# Patient Record
Sex: FEMALE | Race: WHITE | NOT HISPANIC OR LATINO | Employment: OTHER | URBAN - METROPOLITAN AREA
[De-identification: names, ages, dates, MRNs, and addresses within clinical notes are randomized per-mention and may not be internally consistent; named-entity substitution may affect disease eponyms.]

---

## 2018-07-24 ENCOUNTER — HOSPITAL ENCOUNTER (OUTPATIENT)
Dept: RADIOLOGY | Facility: MEDICAL CENTER | Age: 73
End: 2018-07-24
Attending: EMERGENCY MEDICINE
Payer: MEDICARE

## 2018-07-24 DIAGNOSIS — S93.402D SPRAIN OF LEFT ANKLE, UNSPECIFIED LIGAMENT, SUBSEQUENT ENCOUNTER: ICD-10-CM

## 2018-07-24 PROCEDURE — 73610 X-RAY EXAM OF ANKLE: CPT | Mod: LT

## 2018-10-02 ENCOUNTER — HOSPITAL ENCOUNTER (OUTPATIENT)
Dept: LAB | Facility: MEDICAL CENTER | Age: 73
End: 2018-10-02
Attending: EMERGENCY MEDICINE
Payer: MEDICARE

## 2018-10-02 LAB
BASOPHILS # BLD AUTO: 0.7 % (ref 0–1.8)
BASOPHILS # BLD: 0.04 K/UL (ref 0–0.12)
EOSINOPHIL # BLD AUTO: 0.08 K/UL (ref 0–0.51)
EOSINOPHIL NFR BLD: 1.3 % (ref 0–6.9)
ERYTHROCYTE [DISTWIDTH] IN BLOOD BY AUTOMATED COUNT: 45.1 FL (ref 35.9–50)
ESTRADIOL SERPL-MCNC: <20 PG/ML
HCT VFR BLD AUTO: 39.2 % (ref 37–47)
HGB BLD-MCNC: 12.5 G/DL (ref 12–16)
IMM GRANULOCYTES # BLD AUTO: 0.01 K/UL (ref 0–0.11)
IMM GRANULOCYTES NFR BLD AUTO: 0.2 % (ref 0–0.9)
LYMPHOCYTES # BLD AUTO: 1.92 K/UL (ref 1–4.8)
LYMPHOCYTES NFR BLD: 32.2 % (ref 22–41)
MCH RBC QN AUTO: 29.7 PG (ref 27–33)
MCHC RBC AUTO-ENTMCNC: 31.9 G/DL (ref 33.6–35)
MCV RBC AUTO: 93.1 FL (ref 81.4–97.8)
MONOCYTES # BLD AUTO: 0.44 K/UL (ref 0–0.85)
MONOCYTES NFR BLD AUTO: 7.4 % (ref 0–13.4)
NEUTROPHILS # BLD AUTO: 3.48 K/UL (ref 2–7.15)
NEUTROPHILS NFR BLD: 58.2 % (ref 44–72)
NRBC # BLD AUTO: 0 K/UL
NRBC BLD-RTO: 0 /100 WBC
PLATELET # BLD AUTO: 257 K/UL (ref 164–446)
PMV BLD AUTO: 10 FL (ref 9–12.9)
PROGEST SERPL-MCNC: 0.36 NG/ML
RBC # BLD AUTO: 4.21 M/UL (ref 4.2–5.4)
VIT B12 SERPL-MCNC: 1457 PG/ML (ref 211–911)
WBC # BLD AUTO: 6 K/UL (ref 4.8–10.8)

## 2018-10-02 PROCEDURE — 84144 ASSAY OF PROGESTERONE: CPT

## 2018-10-02 PROCEDURE — 84403 ASSAY OF TOTAL TESTOSTERONE: CPT

## 2018-10-02 PROCEDURE — 36415 COLL VENOUS BLD VENIPUNCTURE: CPT

## 2018-10-02 PROCEDURE — 82670 ASSAY OF TOTAL ESTRADIOL: CPT

## 2018-10-02 PROCEDURE — 84270 ASSAY OF SEX HORMONE GLOBUL: CPT

## 2018-10-02 PROCEDURE — 85025 COMPLETE CBC W/AUTO DIFF WBC: CPT

## 2018-10-02 PROCEDURE — 82607 VITAMIN B-12: CPT

## 2018-10-06 LAB
SHBG SERPL-SCNC: 169 NMOL/L (ref 30–135)
TESTOST FREE SERPL-MCNC: 1.5 PG/ML (ref 0.6–3.8)
TESTOST SERPL-MCNC: 29 NG/DL (ref 5–32)

## 2020-09-20 ENCOUNTER — APPOINTMENT (OUTPATIENT)
Dept: RADIOLOGY | Facility: MEDICAL CENTER | Age: 75
DRG: 062 | End: 2020-09-20
Attending: EMERGENCY MEDICINE
Payer: MEDICARE

## 2020-09-20 ENCOUNTER — HOSPITAL ENCOUNTER (INPATIENT)
Facility: MEDICAL CENTER | Age: 75
LOS: 2 days | DRG: 062 | End: 2020-09-22
Attending: EMERGENCY MEDICINE | Admitting: HOSPITALIST
Payer: MEDICARE

## 2020-09-20 DIAGNOSIS — I63.9 ACUTE CVA (CEREBROVASCULAR ACCIDENT) (HCC): ICD-10-CM

## 2020-09-20 DIAGNOSIS — I63.9 ACUTE THROMBOTIC STROKE (HCC): ICD-10-CM

## 2020-09-20 PROBLEM — R53.1 RIGHT SIDED WEAKNESS: Status: ACTIVE | Noted: 2020-09-20

## 2020-09-20 LAB
ABO + RH BLD: NORMAL
ABO GROUP BLD: NORMAL
ALBUMIN SERPL BCP-MCNC: 4.6 G/DL (ref 3.2–4.9)
ALBUMIN/GLOB SERPL: 2 G/DL
ALP SERPL-CCNC: 95 U/L (ref 30–99)
ALT SERPL-CCNC: 20 U/L (ref 2–50)
ANION GAP SERPL CALC-SCNC: 16 MMOL/L (ref 7–16)
APTT PPP: 27.5 SEC (ref 24.7–36)
AST SERPL-CCNC: 25 U/L (ref 12–45)
BASOPHILS # BLD AUTO: 0.2 % (ref 0–1.8)
BASOPHILS # BLD AUTO: 0.3 % (ref 0–1.8)
BASOPHILS # BLD: 0.01 K/UL (ref 0–0.12)
BASOPHILS # BLD: 0.02 K/UL (ref 0–0.12)
BILIRUB SERPL-MCNC: 0.5 MG/DL (ref 0.1–1.5)
BLD GP AB SCN SERPL QL: NORMAL
BUN SERPL-MCNC: 14 MG/DL (ref 8–22)
CALCIUM SERPL-MCNC: 9.4 MG/DL (ref 8.5–10.5)
CHLORIDE SERPL-SCNC: 95 MMOL/L (ref 96–112)
CHOLEST SERPL-MCNC: 223 MG/DL (ref 100–199)
CO2 SERPL-SCNC: 23 MMOL/L (ref 20–33)
COVID ORDER STATUS COVID19: NORMAL
CREAT SERPL-MCNC: 0.59 MG/DL (ref 0.5–1.4)
EKG IMPRESSION: NORMAL
EOSINOPHIL # BLD AUTO: 0.07 K/UL (ref 0–0.51)
EOSINOPHIL # BLD AUTO: 0.1 K/UL (ref 0–0.51)
EOSINOPHIL NFR BLD: 1.3 % (ref 0–6.9)
EOSINOPHIL NFR BLD: 1.7 % (ref 0–6.9)
ERYTHROCYTE [DISTWIDTH] IN BLOOD BY AUTOMATED COUNT: 46.5 FL (ref 35.9–50)
ERYTHROCYTE [DISTWIDTH] IN BLOOD BY AUTOMATED COUNT: 46.8 FL (ref 35.9–50)
EST. AVERAGE GLUCOSE BLD GHB EST-MCNC: 123 MG/DL
GLOBULIN SER CALC-MCNC: 2.3 G/DL (ref 1.9–3.5)
GLUCOSE BLD-MCNC: 90 MG/DL (ref 65–99)
GLUCOSE SERPL-MCNC: 100 MG/DL (ref 65–99)
HBA1C MFR BLD: 5.9 % (ref 0–5.6)
HCT VFR BLD AUTO: 37.1 % (ref 37–47)
HCT VFR BLD AUTO: 38.2 % (ref 37–47)
HDLC SERPL-MCNC: 107 MG/DL
HGB BLD-MCNC: 12.3 G/DL (ref 12–16)
HGB BLD-MCNC: 12.5 G/DL (ref 12–16)
IMM GRANULOCYTES # BLD AUTO: 0.01 K/UL (ref 0–0.11)
IMM GRANULOCYTES # BLD AUTO: 0.01 K/UL (ref 0–0.11)
IMM GRANULOCYTES NFR BLD AUTO: 0.2 % (ref 0–0.9)
IMM GRANULOCYTES NFR BLD AUTO: 0.2 % (ref 0–0.9)
INR PPP: 0.87 (ref 0.87–1.13)
LDLC SERPL CALC-MCNC: 104 MG/DL
LYMPHOCYTES # BLD AUTO: 1.6 K/UL (ref 1–4.8)
LYMPHOCYTES # BLD AUTO: 2.01 K/UL (ref 1–4.8)
LYMPHOCYTES NFR BLD: 27.9 % (ref 22–41)
LYMPHOCYTES NFR BLD: 38.4 % (ref 22–41)
MCH RBC QN AUTO: 30.4 PG (ref 27–33)
MCH RBC QN AUTO: 30.7 PG (ref 27–33)
MCHC RBC AUTO-ENTMCNC: 32.7 G/DL (ref 33.6–35)
MCHC RBC AUTO-ENTMCNC: 33.2 G/DL (ref 33.6–35)
MCV RBC AUTO: 92.5 FL (ref 81.4–97.8)
MCV RBC AUTO: 92.9 FL (ref 81.4–97.8)
MONOCYTES # BLD AUTO: 0.48 K/UL (ref 0–0.85)
MONOCYTES # BLD AUTO: 0.53 K/UL (ref 0–0.85)
MONOCYTES NFR BLD AUTO: 10.1 % (ref 0–13.4)
MONOCYTES NFR BLD AUTO: 8.4 % (ref 0–13.4)
NEUTROPHILS # BLD AUTO: 2.6 K/UL (ref 2–7.15)
NEUTROPHILS # BLD AUTO: 3.53 K/UL (ref 2–7.15)
NEUTROPHILS NFR BLD: 49.8 % (ref 44–72)
NEUTROPHILS NFR BLD: 61.5 % (ref 44–72)
NRBC # BLD AUTO: 0 K/UL
NRBC # BLD AUTO: 0 K/UL
NRBC BLD-RTO: 0 /100 WBC
NRBC BLD-RTO: 0 /100 WBC
PLATELET # BLD AUTO: 240 K/UL (ref 164–446)
PLATELET # BLD AUTO: 251 K/UL (ref 164–446)
PMV BLD AUTO: 9.5 FL (ref 9–12.9)
PMV BLD AUTO: 9.8 FL (ref 9–12.9)
POTASSIUM SERPL-SCNC: 4 MMOL/L (ref 3.6–5.5)
PROT SERPL-MCNC: 6.9 G/DL (ref 6–8.2)
PROTHROMBIN TIME: 12 SEC (ref 12–14.6)
RBC # BLD AUTO: 4.01 M/UL (ref 4.2–5.4)
RBC # BLD AUTO: 4.11 M/UL (ref 4.2–5.4)
RH BLD: NORMAL
SARS-COV-2 RDRP RESP QL NAA+PROBE: NOTDETECTED
SODIUM SERPL-SCNC: 134 MMOL/L (ref 135–145)
SPECIMEN SOURCE: NORMAL
TRIGL SERPL-MCNC: 60 MG/DL (ref 0–149)
TROPONIN T SERPL-MCNC: 12 NG/L (ref 6–19)
WBC # BLD AUTO: 5.2 K/UL (ref 4.8–10.8)
WBC # BLD AUTO: 5.7 K/UL (ref 4.8–10.8)

## 2020-09-20 PROCEDURE — 80061 LIPID PANEL: CPT

## 2020-09-20 PROCEDURE — 3E03317 INTRODUCTION OF OTHER THROMBOLYTIC INTO PERIPHERAL VEIN, PERCUTANEOUS APPROACH: ICD-10-PCS | Performed by: EMERGENCY MEDICINE

## 2020-09-20 PROCEDURE — C9803 HOPD COVID-19 SPEC COLLECT: HCPCS | Performed by: EMERGENCY MEDICINE

## 2020-09-20 PROCEDURE — U0004 COV-19 TEST NON-CDC HGH THRU: HCPCS

## 2020-09-20 PROCEDURE — 99291 CRITICAL CARE FIRST HOUR: CPT | Performed by: INTERNAL MEDICINE

## 2020-09-20 PROCEDURE — 0042T CT-CEREBRAL PERFUSION ANALYSIS: CPT

## 2020-09-20 PROCEDURE — 71045 X-RAY EXAM CHEST 1 VIEW: CPT

## 2020-09-20 PROCEDURE — 700111 HCHG RX REV CODE 636 W/ 250 OVERRIDE (IP): Mod: JG | Performed by: PSYCHIATRY & NEUROLOGY

## 2020-09-20 PROCEDURE — 85610 PROTHROMBIN TIME: CPT

## 2020-09-20 PROCEDURE — 99291 CRITICAL CARE FIRST HOUR: CPT | Performed by: PSYCHIATRY & NEUROLOGY

## 2020-09-20 PROCEDURE — 70496 CT ANGIOGRAPHY HEAD: CPT

## 2020-09-20 PROCEDURE — 82962 GLUCOSE BLOOD TEST: CPT

## 2020-09-20 PROCEDURE — 99291 CRITICAL CARE FIRST HOUR: CPT

## 2020-09-20 PROCEDURE — 86901 BLOOD TYPING SEROLOGIC RH(D): CPT

## 2020-09-20 PROCEDURE — 86900 BLOOD TYPING SEROLOGIC ABO: CPT

## 2020-09-20 PROCEDURE — 70498 CT ANGIOGRAPHY NECK: CPT

## 2020-09-20 PROCEDURE — 700117 HCHG RX CONTRAST REV CODE 255: Performed by: EMERGENCY MEDICINE

## 2020-09-20 PROCEDURE — 37195 THROMBOLYTIC THERAPY STROKE: CPT

## 2020-09-20 PROCEDURE — 94760 N-INVAS EAR/PLS OXIMETRY 1: CPT

## 2020-09-20 PROCEDURE — 83036 HEMOGLOBIN GLYCOSYLATED A1C: CPT

## 2020-09-20 PROCEDURE — 770022 HCHG ROOM/CARE - ICU (200)

## 2020-09-20 PROCEDURE — 85025 COMPLETE CBC W/AUTO DIFF WBC: CPT | Mod: 91

## 2020-09-20 PROCEDURE — 84484 ASSAY OF TROPONIN QUANT: CPT

## 2020-09-20 PROCEDURE — 85730 THROMBOPLASTIN TIME PARTIAL: CPT

## 2020-09-20 PROCEDURE — 93005 ELECTROCARDIOGRAM TRACING: CPT | Performed by: EMERGENCY MEDICINE

## 2020-09-20 PROCEDURE — 86850 RBC ANTIBODY SCREEN: CPT

## 2020-09-20 PROCEDURE — 700105 HCHG RX REV CODE 258: Performed by: PSYCHIATRY & NEUROLOGY

## 2020-09-20 PROCEDURE — 70450 CT HEAD/BRAIN W/O DYE: CPT

## 2020-09-20 PROCEDURE — 80053 COMPREHEN METABOLIC PANEL: CPT

## 2020-09-20 PROCEDURE — 700105 HCHG RX REV CODE 258: Performed by: INTERNAL MEDICINE

## 2020-09-20 RX ORDER — ATORVASTATIN CALCIUM 80 MG/1
80 TABLET, FILM COATED ORAL EVERY EVENING
Status: DISCONTINUED | OUTPATIENT
Start: 2020-09-20 | End: 2020-09-22 | Stop reason: HOSPADM

## 2020-09-20 RX ORDER — LABETALOL HYDROCHLORIDE 5 MG/ML
10 INJECTION, SOLUTION INTRAVENOUS EVERY 4 HOURS PRN
Status: DISCONTINUED | OUTPATIENT
Start: 2020-09-20 | End: 2020-09-22 | Stop reason: HOSPADM

## 2020-09-20 RX ORDER — SODIUM CHLORIDE 9 MG/ML
50 INJECTION, SOLUTION INTRAVENOUS ONCE
Status: COMPLETED | OUTPATIENT
Start: 2020-09-20 | End: 2020-09-20

## 2020-09-20 RX ORDER — ENALAPRILAT 1.25 MG/ML
1.25 INJECTION INTRAVENOUS EVERY 6 HOURS PRN
Status: DISCONTINUED | OUTPATIENT
Start: 2020-09-20 | End: 2020-09-22 | Stop reason: HOSPADM

## 2020-09-20 RX ORDER — ASPIRIN 81 MG/1
324 TABLET, CHEWABLE ORAL DAILY
Status: DISCONTINUED | OUTPATIENT
Start: 2020-09-21 | End: 2020-09-21

## 2020-09-20 RX ORDER — TIMOLOL MALEATE 5 MG/ML
1 SOLUTION/ DROPS OPHTHALMIC EVERY MORNING
Status: DISCONTINUED | OUTPATIENT
Start: 2020-09-21 | End: 2020-09-22 | Stop reason: HOSPADM

## 2020-09-20 RX ORDER — SODIUM CHLORIDE 9 MG/ML
INJECTION, SOLUTION INTRAVENOUS CONTINUOUS
Status: DISCONTINUED | OUTPATIENT
Start: 2020-09-20 | End: 2020-09-21

## 2020-09-20 RX ORDER — ONDANSETRON 2 MG/ML
4 INJECTION INTRAMUSCULAR; INTRAVENOUS EVERY 4 HOURS PRN
Status: DISCONTINUED | OUTPATIENT
Start: 2020-09-20 | End: 2020-09-22 | Stop reason: HOSPADM

## 2020-09-20 RX ORDER — AMLODIPINE BESYLATE 5 MG/1
5 TABLET ORAL EVERY MORNING
COMMUNITY

## 2020-09-20 RX ORDER — BRIMONIDINE TARTRATE 2 MG/ML
1 SOLUTION/ DROPS OPHTHALMIC 2 TIMES DAILY
COMMUNITY

## 2020-09-20 RX ORDER — BISACODYL 10 MG
10 SUPPOSITORY, RECTAL RECTAL
Status: DISCONTINUED | OUTPATIENT
Start: 2020-09-20 | End: 2020-09-22 | Stop reason: HOSPADM

## 2020-09-20 RX ORDER — ASPIRIN 300 MG/1
300 SUPPOSITORY RECTAL DAILY
Status: DISCONTINUED | OUTPATIENT
Start: 2020-09-21 | End: 2020-09-21

## 2020-09-20 RX ORDER — TIMOLOL MALEATE 5 MG/ML
1 SOLUTION/ DROPS OPHTHALMIC EVERY MORNING
COMMUNITY

## 2020-09-20 RX ORDER — BRIMONIDINE TARTRATE 2 MG/ML
1 SOLUTION/ DROPS OPHTHALMIC 2 TIMES DAILY
Status: DISCONTINUED | OUTPATIENT
Start: 2020-09-20 | End: 2020-09-22 | Stop reason: HOSPADM

## 2020-09-20 RX ORDER — ASPIRIN 325 MG
325 TABLET ORAL DAILY
Status: DISCONTINUED | OUTPATIENT
Start: 2020-09-21 | End: 2020-09-21

## 2020-09-20 RX ORDER — AMOXICILLIN 250 MG
2 CAPSULE ORAL 2 TIMES DAILY
Status: DISCONTINUED | OUTPATIENT
Start: 2020-09-20 | End: 2020-09-22 | Stop reason: HOSPADM

## 2020-09-20 RX ORDER — ONDANSETRON 4 MG/1
4 TABLET, ORALLY DISINTEGRATING ORAL EVERY 4 HOURS PRN
Status: DISCONTINUED | OUTPATIENT
Start: 2020-09-20 | End: 2020-09-22 | Stop reason: HOSPADM

## 2020-09-20 RX ORDER — POLYETHYLENE GLYCOL 3350 17 G/17G
1 POWDER, FOR SOLUTION ORAL
Status: DISCONTINUED | OUTPATIENT
Start: 2020-09-20 | End: 2020-09-22 | Stop reason: HOSPADM

## 2020-09-20 RX ADMIN — IOHEXOL 40 ML: 350 INJECTION, SOLUTION INTRAVENOUS at 19:49

## 2020-09-20 RX ADMIN — ALTEPLASE 42 MG: KIT at 19:52

## 2020-09-20 RX ADMIN — SODIUM CHLORIDE: 9 INJECTION, SOLUTION INTRAVENOUS at 21:59

## 2020-09-20 RX ADMIN — IOHEXOL 80 ML: 350 INJECTION, SOLUTION INTRAVENOUS at 19:51

## 2020-09-20 RX ADMIN — SODIUM CHLORIDE 50 ML: 9 INJECTION, SOLUTION INTRAVENOUS at 20:52

## 2020-09-20 SDOH — HEALTH STABILITY: MENTAL HEALTH: HOW OFTEN DO YOU HAVE A DRINK CONTAINING ALCOHOL?: NEVER

## 2020-09-20 ASSESSMENT — ENCOUNTER SYMPTOMS
SINUS PAIN: 0
HEARTBURN: 0
BLURRED VISION: 0
PALPITATIONS: 0
DEPRESSION: 0
WHEEZING: 0
FOCAL WEAKNESS: 1
WEIGHT LOSS: 0
CHILLS: 0
SENSORY CHANGE: 1
DIZZINESS: 0
SPUTUM PRODUCTION: 0
BRUISES/BLEEDS EASILY: 0
FEVER: 0
POLYDIPSIA: 0
PHOTOPHOBIA: 0
VOMITING: 0
BACK PAIN: 0
NERVOUS/ANXIOUS: 0
TINGLING: 0
HEADACHES: 0
BLOOD IN STOOL: 0
STRIDOR: 0
DIAPHORESIS: 0
SHORTNESS OF BREATH: 0
SPEECH CHANGE: 1
MYALGIAS: 0
COUGH: 0
DOUBLE VISION: 0
NECK PAIN: 0
HEMOPTYSIS: 0

## 2020-09-20 ASSESSMENT — PAIN DESCRIPTION - PAIN TYPE: TYPE: ACUTE PAIN

## 2020-09-20 ASSESSMENT — FIBROSIS 4 INDEX: FIB4 SCORE: 1.77

## 2020-09-21 ENCOUNTER — APPOINTMENT (OUTPATIENT)
Dept: RADIOLOGY | Facility: MEDICAL CENTER | Age: 75
DRG: 062 | End: 2020-09-21
Attending: PSYCHIATRY & NEUROLOGY
Payer: MEDICARE

## 2020-09-21 ENCOUNTER — APPOINTMENT (OUTPATIENT)
Dept: CARDIOLOGY | Facility: MEDICAL CENTER | Age: 75
DRG: 062 | End: 2020-09-21
Attending: INTERNAL MEDICINE
Payer: MEDICARE

## 2020-09-21 DIAGNOSIS — I63.9 ACUTE CVA (CEREBROVASCULAR ACCIDENT) (HCC): ICD-10-CM

## 2020-09-21 DIAGNOSIS — R53.1 RIGHT SIDED WEAKNESS: ICD-10-CM

## 2020-09-21 LAB
ANION GAP SERPL CALC-SCNC: 12 MMOL/L (ref 7–16)
BUN SERPL-MCNC: 11 MG/DL (ref 8–22)
CALCIUM SERPL-MCNC: 8.4 MG/DL (ref 8.5–10.5)
CHLORIDE SERPL-SCNC: 101 MMOL/L (ref 96–112)
CO2 SERPL-SCNC: 23 MMOL/L (ref 20–33)
CREAT SERPL-MCNC: 0.47 MG/DL (ref 0.5–1.4)
ERYTHROCYTE [DISTWIDTH] IN BLOOD BY AUTOMATED COUNT: 46.8 FL (ref 35.9–50)
GLUCOSE SERPL-MCNC: 90 MG/DL (ref 65–99)
HCT VFR BLD AUTO: 34.8 % (ref 37–47)
HGB BLD-MCNC: 11.5 G/DL (ref 12–16)
LV EJECT FRACT  99904: 65
LV EJECT FRACT MOD 2C 99903: 64.11
LV EJECT FRACT MOD 4C 99902: 70.57
LV EJECT FRACT MOD BP 99901: 67.51
MAGNESIUM SERPL-MCNC: 2.1 MG/DL (ref 1.5–2.5)
MCH RBC QN AUTO: 30.7 PG (ref 27–33)
MCHC RBC AUTO-ENTMCNC: 33 G/DL (ref 33.6–35)
MCV RBC AUTO: 93 FL (ref 81.4–97.8)
PHOSPHATE SERPL-MCNC: 4.1 MG/DL (ref 2.5–4.5)
PLATELET # BLD AUTO: 231 K/UL (ref 164–446)
PMV BLD AUTO: 9.9 FL (ref 9–12.9)
POTASSIUM SERPL-SCNC: 4.2 MMOL/L (ref 3.6–5.5)
RBC # BLD AUTO: 3.74 M/UL (ref 4.2–5.4)
SODIUM SERPL-SCNC: 136 MMOL/L (ref 135–145)
WBC # BLD AUTO: 5.2 K/UL (ref 4.8–10.8)

## 2020-09-21 PROCEDURE — 700102 HCHG RX REV CODE 250 W/ 637 OVERRIDE(OP): Performed by: INTERNAL MEDICINE

## 2020-09-21 PROCEDURE — 99291 CRITICAL CARE FIRST HOUR: CPT | Performed by: PSYCHIATRY & NEUROLOGY

## 2020-09-21 PROCEDURE — 99233 SBSQ HOSP IP/OBS HIGH 50: CPT | Performed by: NURSE PRACTITIONER

## 2020-09-21 PROCEDURE — 84100 ASSAY OF PHOSPHORUS: CPT

## 2020-09-21 PROCEDURE — 92610 EVALUATE SWALLOWING FUNCTION: CPT

## 2020-09-21 PROCEDURE — 700102 HCHG RX REV CODE 250 W/ 637 OVERRIDE(OP): Performed by: NURSE PRACTITIONER

## 2020-09-21 PROCEDURE — 700105 HCHG RX REV CODE 258: Performed by: INTERNAL MEDICINE

## 2020-09-21 PROCEDURE — 85027 COMPLETE CBC AUTOMATED: CPT

## 2020-09-21 PROCEDURE — A9270 NON-COVERED ITEM OR SERVICE: HCPCS | Performed by: INTERNAL MEDICINE

## 2020-09-21 PROCEDURE — A9270 NON-COVERED ITEM OR SERVICE: HCPCS | Performed by: NURSE PRACTITIONER

## 2020-09-21 PROCEDURE — 770022 HCHG ROOM/CARE - ICU (200)

## 2020-09-21 PROCEDURE — 93306 TTE W/DOPPLER COMPLETE: CPT | Mod: 26 | Performed by: INTERNAL MEDICINE

## 2020-09-21 PROCEDURE — 93306 TTE W/DOPPLER COMPLETE: CPT

## 2020-09-21 PROCEDURE — 70551 MRI BRAIN STEM W/O DYE: CPT

## 2020-09-21 PROCEDURE — 80048 BASIC METABOLIC PNL TOTAL CA: CPT

## 2020-09-21 PROCEDURE — 83735 ASSAY OF MAGNESIUM: CPT

## 2020-09-21 PROCEDURE — 700101 HCHG RX REV CODE 250: Performed by: INTERNAL MEDICINE

## 2020-09-21 RX ORDER — ASPIRIN 325 MG
325 TABLET ORAL DAILY
Status: DISCONTINUED | OUTPATIENT
Start: 2020-09-21 | End: 2020-09-22 | Stop reason: HOSPADM

## 2020-09-21 RX ORDER — ASPIRIN 300 MG/1
300 SUPPOSITORY RECTAL DAILY
Status: DISCONTINUED | OUTPATIENT
Start: 2020-09-21 | End: 2020-09-22 | Stop reason: HOSPADM

## 2020-09-21 RX ORDER — ASPIRIN 81 MG/1
324 TABLET, CHEWABLE ORAL DAILY
Status: DISCONTINUED | OUTPATIENT
Start: 2020-09-21 | End: 2020-09-22 | Stop reason: HOSPADM

## 2020-09-21 RX ADMIN — ATORVASTATIN CALCIUM 80 MG: 40 TABLET, FILM COATED ORAL at 20:50

## 2020-09-21 RX ADMIN — ASPIRIN 325 MG: 325 TABLET, FILM COATED ORAL at 20:51

## 2020-09-21 RX ADMIN — TIMOLOL MALEATE 1 DROP: 5 SOLUTION/ DROPS OPHTHALMIC at 06:14

## 2020-09-21 RX ADMIN — BRIMONIDINE TARTRATE 1 DROP: 2 SOLUTION OPHTHALMIC at 06:14

## 2020-09-21 RX ADMIN — BRIMONIDINE TARTRATE 1 DROP: 2 SOLUTION OPHTHALMIC at 20:50

## 2020-09-21 RX ADMIN — SODIUM CHLORIDE: 9 INJECTION, SOLUTION INTRAVENOUS at 08:10

## 2020-09-21 ASSESSMENT — ENCOUNTER SYMPTOMS
CHILLS: 0
PSYCHIATRIC NEGATIVE: 1
WEIGHT LOSS: 0
NAUSEA: 0
SHORTNESS OF BREATH: 0
FEVER: 0
VOMITING: 0
MUSCULOSKELETAL NEGATIVE: 1
EYES NEGATIVE: 1
SPEECH CHANGE: 1

## 2020-09-21 ASSESSMENT — PAIN DESCRIPTION - PAIN TYPE
TYPE: ACUTE PAIN

## 2020-09-21 ASSESSMENT — LIFESTYLE VARIABLES
TOTAL SCORE: 0
TOTAL SCORE: 0
EVER HAD A DRINK FIRST THING IN THE MORNING TO STEADY YOUR NERVES TO GET RID OF A HANGOVER: NO
CONSUMPTION TOTAL: POSITIVE
DOES PATIENT WANT TO STOP DRINKING: NO
ON A TYPICAL DAY WHEN YOU DRINK ALCOHOL HOW MANY DRINKS DO YOU HAVE: 2
TOTAL SCORE: 0
HAVE YOU EVER FELT YOU SHOULD CUT DOWN ON YOUR DRINKING: NO
HOW MANY TIMES IN THE PAST YEAR HAVE YOU HAD 5 OR MORE DRINKS IN A DAY: 0
AVERAGE NUMBER OF DAYS PER WEEK YOU HAVE A DRINK CONTAINING ALCOHOL: 7
ALCOHOL_USE: YES
EVER FELT BAD OR GUILTY ABOUT YOUR DRINKING: NO
HAVE PEOPLE ANNOYED YOU BY CRITICIZING YOUR DRINKING: NO

## 2020-09-21 ASSESSMENT — PATIENT HEALTH QUESTIONNAIRE - PHQ9
7. TROUBLE CONCENTRATING ON THINGS, SUCH AS READING THE NEWSPAPER OR WATCHING TELEVISION: NOT AT ALL
5. POOR APPETITE OR OVEREATING: NOT AT ALL
2. FEELING DOWN, DEPRESSED, IRRITABLE, OR HOPELESS: SEVERAL DAYS
6. FEELING BAD ABOUT YOURSELF - OR THAT YOU ARE A FAILURE OR HAVE LET YOURSELF OR YOUR FAMILY DOWN: NOT AL ALL
SUM OF ALL RESPONSES TO PHQ9 QUESTIONS 1 AND 2: 2
4. FEELING TIRED OR HAVING LITTLE ENERGY: SEVERAL DAYS
1. LITTLE INTEREST OR PLEASURE IN DOING THINGS: SEVERAL DAYS
3. TROUBLE FALLING OR STAYING ASLEEP OR SLEEPING TOO MUCH: NOT AT ALL
9. THOUGHTS THAT YOU WOULD BE BETTER OFF DEAD, OR OF HURTING YOURSELF: NOT AT ALL
8. MOVING OR SPEAKING SO SLOWLY THAT OTHER PEOPLE COULD HAVE NOTICED. OR THE OPPOSITE, BEING SO FIGETY OR RESTLESS THAT YOU HAVE BEEN MOVING AROUND A LOT MORE THAN USUAL: NOT AT ALL
SUM OF ALL RESPONSES TO PHQ QUESTIONS 1-9: 3

## 2020-09-21 ASSESSMENT — COGNITIVE AND FUNCTIONAL STATUS - GENERAL
CLIMB 3 TO 5 STEPS WITH RAILING: A LITTLE
WALKING IN HOSPITAL ROOM: A LITTLE
STANDING UP FROM CHAIR USING ARMS: A LITTLE
SUGGESTED CMS G CODE MODIFIER DAILY ACTIVITY: CI
MOBILITY SCORE: 20
TOILETING: A LITTLE
DAILY ACTIVITIY SCORE: 23
MOVING FROM LYING ON BACK TO SITTING ON SIDE OF FLAT BED: A LITTLE
SUGGESTED CMS G CODE MODIFIER MOBILITY: CJ

## 2020-09-21 NOTE — PROGRESS NOTES
Critical Care Progress Note    Date of admission  9/20/2020    Chief Complaint  74 y.o. female admitted 9/20/2020 with AIS s/p TPA    Hospital Course    74 y.o. female who presented 9/20/2020 with acute right facial dropp and right arm weakness.  She recently came back from [place].  She has never had a stroke. No contraindications to TPA, of which was given in ED.  CT head normal and cta head and neck no abnormalities.  She says weakness is improved post tpa but not back to normal.  She has a hx pancreatic cancer and whipple procedure 15 years ago and considered curative.  Last week had a feeling of racing heart.  She is on timolol eye drops, brimonidine eye drops and amlodipine outpt    9/20 AIS s/p tPA      Interval Problem Update  Reviewed last 24 hour events:  - Overnight events/issues:none   - Neuro: GCS 15, NIHSS   - Tmax: 36.7   - HR: 50-60s   - SBP: 110-130s   - Nutrition:Per speech   - UOP: good urine output   - Bowel movement:-   - Neuroimaging: MRI pending   - Antimicrobials:none   - Sedation:none   - Other Gtts:none      Review of Systems  Review of Systems   Constitutional: Negative for chills, fever and weight loss.   HENT: Negative.    Eyes: Negative.    Respiratory: Negative for shortness of breath.    Cardiovascular: Negative for chest pain.   Gastrointestinal: Negative for nausea and vomiting.   Genitourinary: Negative.    Musculoskeletal: Negative.    Skin: Negative.    Neurological: Positive for speech change.   Psychiatric/Behavioral: Negative.         Vital Signs for last 24 hours   Temp:  [36.1 °C (96.9 °F)-36.7 °C (98.1 °F)] 36.7 °C (98 °F)  Pulse:  [53-84] 53  Resp:  [14-27] 14  BP: (113-149)/(61-88) 115/71  SpO2:  [90 %-98 %] 98 %    Hemodynamic parameters for last 24 hours       Respiratory Information for the last 24 hours       Physical Exam   Physical Exam  Constitutional:       General: She is not in acute distress.  HENT:      Head: Normocephalic and atraumatic.      Nose: Nose  normal.      Mouth/Throat:      Mouth: Mucous membranes are moist.      Pharynx: Oropharynx is clear.   Eyes:      Extraocular Movements: Extraocular movements intact.      Pupils: Pupils are equal, round, and reactive to light.   Cardiovascular:      Rate and Rhythm: Normal rate and regular rhythm.      Pulses: Normal pulses.   Pulmonary:      Effort: Pulmonary effort is normal.   Abdominal:      General: Abdomen is flat. Bowel sounds are normal.   Skin:     General: Skin is warm and dry.      Capillary Refill: Capillary refill takes less than 2 seconds.   Neurological:      Mental Status: She is alert.      Comments: GCS 15. VFF, Right facial droop. Very mild dysarthria. Very mild word finding difficulties. No drift.         Medications  Current Facility-Administered Medications   Medication Dose Route Frequency Provider Last Rate Last Dose   • senna-docusate (PERICOLACE or SENOKOT S) 8.6-50 MG per tablet 2 Tab  2 Tab Oral BID Manny Guerra M.D.   Stopped at 09/20/20 2115    And   • polyethylene glycol/lytes (MIRALAX) PACKET 1 Packet  1 Packet Oral QDAY PRN Manny Guerra M.D.        And   • magnesium hydroxide (MILK OF MAGNESIA) suspension 30 mL  30 mL Oral QDAY PRN Manny Guerra M.D.        And   • bisacodyl (DULCOLAX) suppository 10 mg  10 mg Rectal QDAY PRN Manny Guerra M.D.       • ondansetron (ZOFRAN) syringe/vial injection 4 mg  4 mg Intravenous Q4HRS PRN Manny Guerra M.D.       • ondansetron (ZOFRAN ODT) dispertab 4 mg  4 mg Oral Q4HRS PRN Manny Guerra M.D.       • NS infusion   Intravenous Continuous Manny Guerra M.D. 100 mL/hr at 09/20/20 2159     • aspirin (ASA) tablet 325 mg  325 mg Oral DAILY Manny Guerra M.D.        Or   • aspirin (ASA) chewable tab 324 mg  324 mg Oral DAILY Manny Guerra M.D.        Or   • aspirin (ASA) suppository 300 mg  300 mg Rectal DAILY Manny Guerra M.D.       • labetalol (NORMODYNE/TRANDATE) injection 10 mg  10 mg Intravenous Q4HRS PRN Manny Guerra M.D.         Or   • enalaprilat (VASOTEC) injection 1.25 mg  1.25 mg Intravenous Q6HRS PRN Manny Guerra M.D.       • brimonidine (ALPHAGAN) 0.2 % ophthalmic solution 1 Drop  1 Drop Right Eye BID Manny Guerra M.D.   1 Drop at 09/21/20 0614   • timolol (TIMOPTIC) 0.5 % ophthalmic solution 1 Drop  1 Drop Right Eye QAM Manny Guerra M.D.   1 Drop at 09/21/20 0614   • atorvastatin (LIPITOR) tablet 80 mg  80 mg Oral Q EVENING Manny Guerra M.D.   Stopped at 09/20/20 2215       Fluids    Intake/Output Summary (Last 24 hours) at 9/21/2020 0641  Last data filed at 9/21/2020 0600  Gross per 24 hour   Intake 1185.27 ml   Output 400 ml   Net 785.27 ml       Laboratory          Recent Labs     09/20/20 1931 09/21/20  0330   SODIUM 134* 136   POTASSIUM 4.0 4.2   CHLORIDE 95* 101   CO2 23 23   BUN 14 11   CREATININE 0.59 0.47*   MAGNESIUM  --  2.1   PHOSPHORUS  --  4.1   CALCIUM 9.4 8.4*     Recent Labs     09/20/20 1931 09/21/20  0330   ALTSGPT 20  --    ASTSGOT 25  --    ALKPHOSPHAT 95  --    TBILIRUBIN 0.5  --    GLUCOSE 100* 90     Recent Labs     09/20/20 1931 09/20/20 2210 09/21/20  0330   WBC 5.2 5.7 5.2   NEUTSPOLYS 49.80 61.50  --    LYMPHOCYTES 38.40 27.90  --    MONOCYTES 10.10 8.40  --    EOSINOPHILS 1.30 1.70  --    BASOPHILS 0.20 0.30  --    ASTSGOT 25  --   --    ALTSGPT 20  --   --    ALKPHOSPHAT 95  --   --    TBILIRUBIN 0.5  --   --      Recent Labs     09/20/20 1931 09/20/20 2210 09/21/20  0330   RBC 4.11* 4.01* 3.74*   HEMOGLOBIN 12.5 12.3 11.5*   HEMATOCRIT 38.2 37.1 34.8*   PLATELETCT 251 240 231   PROTHROMBTM 12.0  --   --    APTT 27.5  --   --    INR 0.87  --   --        Imaging  CT:    Reviewed    Assessment/Plan  Acute CVA (cerebrovascular accident) (HCC)  Assessment & Plan  AIS s/p tpa  Etiological stroke work up ongoing  Neuro checks per tpa protocol  MRI at 24 hours, ASA and pharm DVT PPX at that time if no evidence of hemorrhagic conversion  BP parameters per tpa protocol <180/105         VTE:   Contraindicated  Ulcer: Not Indicated  Lines: None    I have performed a physical exam and reviewed and updated ROS and Plan today (9/21/2020). In review of yesterday's note (9/20/2020), there are no changes except as documented above.     Discussed patient condition and risk of morbidity and/or mortality with RN, RT, Pharmacy, Code status disscussed, Charge nurse / hot rounds, Patient and neurology  The patient remains critically ill.  Critical care time = 32 minutes in directly providing and coordinating critical care and extensive data review.  No time overlap and excludes procedures.

## 2020-09-21 NOTE — ED NOTES
Patient's LKW 1830. Patient had a sudden onset of Right facial drooping and unable to swallow per son. Patient said she had numbness and tingling in right arm as well. NIH of 3

## 2020-09-21 NOTE — CONSULTS
Critical Care Consultation    Date of consult: 9/20/2020    Referring Physician  Lb Bonilla M.D.    Reason for Consultation  Acute stroke post tpa    History of Presenting Illness  74 y.o. female who presented 9/20/2020 with acute right facial dropp and right arm weakness.  She recently came back from [place].  She has never had a stroke. No contraindications to TPA, of which was given in ED.  CT head normal and cta head and neck no abnormalities.  She says weakness is improved post tpa but not back to normal.  She has a hx pancreatic cancer and whipple procedure 15 years ago and considered curative.  Last week had a feeling of racing heart.  She is on timolol eye drops, brimonidine eye drops and amlodipine outpt.      Code Status  Full Code    Review of Systems  Review of Systems   Constitutional: Negative for chills, diaphoresis, fever, malaise/fatigue and weight loss.   HENT: Negative for congestion and sinus pain.    Eyes: Negative for blurred vision, double vision and photophobia.   Respiratory: Negative for cough, hemoptysis, sputum production, shortness of breath, wheezing and stridor.    Cardiovascular: Negative for chest pain, palpitations and leg swelling.   Gastrointestinal: Negative for blood in stool, heartburn, melena and vomiting.   Genitourinary: Negative for dysuria and urgency.   Musculoskeletal: Negative for back pain, myalgias and neck pain.   Skin: Negative for itching and rash.   Neurological: Positive for sensory change, speech change and focal weakness. Negative for dizziness, tingling and headaches.        Rght side upper ext and face   Endo/Heme/Allergies: Negative for polydipsia. Does not bruise/bleed easily.   Psychiatric/Behavioral: Negative for depression. The patient is not nervous/anxious.        Past Medical History  Hypertension, pancreatic cancer considered cured 15 years ago    Surgical History  Whipple procedure for pancreatic cancer    Family History  family history is  not on file.    Social History   reports that she has never smoked. She does not have any smokeless tobacco history on file. She reports that she does not drink alcohol or use drugs.    Medications  Home Medications     Reviewed by Reno Hunter (Pharmacy [facility]) on 09/20/20 at 2025  Med List Status: Complete   Medication Last Dose Status   amLODIPine (NORVASC) 5 MG Tab 9/20/2020 Active   brimonidine (ALPHAGAN) 0.2 % Solution 9/20/2020 Active   timolol (TIMOPTIC) 0.5 % Solution 9/20/2020 Active              Current Facility-Administered Medications   Medication Dose Route Frequency Provider Last Rate Last Dose   • senna-docusate (PERICOLACE or SENOKOT S) 8.6-50 MG per tablet 2 Tab  2 Tab Oral BID Manny Guerra M.D.        And   • polyethylene glycol/lytes (MIRALAX) PACKET 1 Packet  1 Packet Oral QDAY PRN Mnany Guerra M.D.        And   • magnesium hydroxide (MILK OF MAGNESIA) suspension 30 mL  30 mL Oral QDAY PRN Manny Guerra M.D.        And   • bisacodyl (DULCOLAX) suppository 10 mg  10 mg Rectal QDAY PRN Manny Guerra M.D.       • ondansetron (ZOFRAN) syringe/vial injection 4 mg  4 mg Intravenous Q4HRS PRN Manny Guerra M.D.       • ondansetron (ZOFRAN ODT) dispertab 4 mg  4 mg Oral Q4HRS PRN Manny Guerra M.D.       • NS infusion   Intravenous Continuous Manny Guerra M.D.       • [START ON 9/21/2020] aspirin (ASA) tablet 325 mg  325 mg Oral DAILY Manny Guerra M.D.        Or   • [START ON 9/21/2020] aspirin (ASA) chewable tab 324 mg  324 mg Oral DAILY Manny Guerra M.D.        Or   • [START ON 9/21/2020] aspirin (ASA) suppository 300 mg  300 mg Rectal DAILY Manny Guerra M.D.       • labetalol (NORMODYNE/TRANDATE) injection 10 mg  10 mg Intravenous Q4HRS PRN Manny Guerra M.D.        Or   • enalaprilat (VASOTEC) injection 1.25 mg  1.25 mg Intravenous Q6HRS PRN Manny Guerra M.D.         Current Outpatient Medications   Medication Sig Dispense Refill   • timolol (TIMOPTIC) 0.5 % Solution Place 1  Drop in right eye every morning.     • brimonidine (ALPHAGAN) 0.2 % Solution Place 1 Drop in right eye 2 Times a Day.     • amLODIPine (NORVASC) 5 MG Tab Take 5 mg by mouth every morning.         Allergies  Allergies   Allergen Reactions   • Percodan-Katherin [Oxycodone-Aspirin]        Vital Signs last 24 hours  Temp:  [36.5 °C (97.7 °F)] 36.5 °C (97.7 °F)  Pulse:  [64-84] 68  Resp:  [14-27] 20  BP: (118-149)/(69-84) 118/72  SpO2:  [92 %-95 %] 94 %    Physical Exam  Physical Exam  Vitals signs and nursing note reviewed. Exam conducted with a chaperone present.   Constitutional:       General: She is not in acute distress.     Appearance: Normal appearance. She is not ill-appearing, toxic-appearing or diaphoretic.   HENT:      Head: Normocephalic and atraumatic.      Right Ear: External ear normal.      Left Ear: External ear normal.      Nose: No congestion or rhinorrhea.      Mouth/Throat:      Mouth: Mucous membranes are dry.      Pharynx: No oropharyngeal exudate or posterior oropharyngeal erythema.   Eyes:      General: No scleral icterus.     Extraocular Movements: Extraocular movements intact.      Conjunctiva/sclera: Conjunctivae normal.      Pupils: Pupils are equal, round, and reactive to light.   Neck:      Musculoskeletal: Neck supple. No neck rigidity or muscular tenderness.   Cardiovascular:      Rate and Rhythm: Normal rate and regular rhythm.      Pulses: Normal pulses.      Heart sounds: Normal heart sounds. No murmur.   Pulmonary:      Effort: No respiratory distress.      Breath sounds: No wheezing.   Abdominal:      General: There is no distension.      Palpations: There is no mass.      Tenderness: There is no abdominal tenderness. There is no guarding.   Musculoskeletal:         General: No swelling or tenderness.      Right lower leg: No edema.      Left lower leg: No edema.   Lymphadenopathy:      Cervical: No cervical adenopathy.   Skin:     Coloration: Skin is not jaundiced or pale.       Findings: No bruising, erythema, lesion or rash.   Neurological:      General: No focal deficit present.      Mental Status: She is alert and oriented to person, place, and time.      Cranial Nerves: Cranial nerve deficit present.      Sensory: No sensory deficit.      Motor: Weakness present.      Coordination: Coordination normal.      Deep Tendon Reflexes: Reflexes normal.      Comments: Right upper ext weakness and right facial droop.  Speech normal   Psychiatric:         Mood and Affect: Mood normal.         Behavior: Behavior normal.         Fluids  No intake or output data in the 24 hours ending 09/20/20 2124    Laboratory  Recent Results (from the past 48 hour(s))   CBC WITH DIFFERENTIAL    Collection Time: 09/20/20  7:31 PM   Result Value Ref Range    WBC 5.2 4.8 - 10.8 K/uL    RBC 4.11 (L) 4.20 - 5.40 M/uL    Hemoglobin 12.5 12.0 - 16.0 g/dL    Hematocrit 38.2 37.0 - 47.0 %    MCV 92.9 81.4 - 97.8 fL    MCH 30.4 27.0 - 33.0 pg    MCHC 32.7 (L) 33.6 - 35.0 g/dL    RDW 46.5 35.9 - 50.0 fL    Platelet Count 251 164 - 446 K/uL    MPV 9.5 9.0 - 12.9 fL    Neutrophils-Polys 49.80 44.00 - 72.00 %    Lymphocytes 38.40 22.00 - 41.00 %    Monocytes 10.10 0.00 - 13.40 %    Eosinophils 1.30 0.00 - 6.90 %    Basophils 0.20 0.00 - 1.80 %    Immature Granulocytes 0.20 0.00 - 0.90 %    Nucleated RBC 0.00 /100 WBC    Neutrophils (Absolute) 2.60 2.00 - 7.15 K/uL    Lymphs (Absolute) 2.01 1.00 - 4.80 K/uL    Monos (Absolute) 0.53 0.00 - 0.85 K/uL    Eos (Absolute) 0.07 0.00 - 0.51 K/uL    Baso (Absolute) 0.01 0.00 - 0.12 K/uL    Immature Granulocytes (abs) 0.01 0.00 - 0.11 K/uL    NRBC (Absolute) 0.00 K/uL   COMP METABOLIC PANEL    Collection Time: 09/20/20  7:31 PM   Result Value Ref Range    Sodium 134 (L) 135 - 145 mmol/L    Potassium 4.0 3.6 - 5.5 mmol/L    Chloride 95 (L) 96 - 112 mmol/L    Co2 23 20 - 33 mmol/L    Anion Gap 16.0 7.0 - 16.0    Glucose 100 (H) 65 - 99 mg/dL    Bun 14 8 - 22 mg/dL    Creatinine 0.59 0.50  - 1.40 mg/dL    Calcium 9.4 8.5 - 10.5 mg/dL    AST(SGOT) 25 12 - 45 U/L    ALT(SGPT) 20 2 - 50 U/L    Alkaline Phosphatase 95 30 - 99 U/L    Total Bilirubin 0.5 0.1 - 1.5 mg/dL    Albumin 4.6 3.2 - 4.9 g/dL    Total Protein 6.9 6.0 - 8.2 g/dL    Globulin 2.3 1.9 - 3.5 g/dL    A-G Ratio 2.0 g/dL   PROTHROMBIN TIME    Collection Time: 20  7:31 PM   Result Value Ref Range    PT 12.0 12.0 - 14.6 sec    INR 0.87 0.87 - 1.13   APTT    Collection Time: 20  7:31 PM   Result Value Ref Range    APTT 27.5 24.7 - 36.0 sec   COD (ADULT)    Collection Time: 20  7:31 PM   Result Value Ref Range    ABO Grouping Only AB     Rh Grouping Only NEG     Antibody Screen-Cod NEG    TROPONIN    Collection Time: 20  7:31 PM   Result Value Ref Range    Troponin T 12 6 - 19 ng/L   ESTIMATED GFR    Collection Time: 20  7:31 PM   Result Value Ref Range    GFR If African American >60 >60 mL/min/1.73 m 2    GFR If Non African American >60 >60 mL/min/1.73 m 2   ACCU-CHEK GLUCOSE    Collection Time: 20  7:32 PM   Result Value Ref Range    Glucose - Accu-Ck 90 65 - 99 mg/dL   EKG (NOW)    Collection Time: 20  8:07 PM   Result Value Ref Range    Report       [facility] Emergency Dept.    Test Date:  2020  Pt Name:    HUGH LOCKETT                 Department: ER  MRN:        [id number]                      Room:        21  Gender:     Female                       Technician: [id number]  :        [phone]                   Requested By:ER TRIAGE PROTOCOL  Order #:    [id number]                    Reading MD: Lb Bonilla MD    Measurements  Intervals                                Axis  Rate:       73                           P:          38  ID:         [id number]                          QRS:        2  QRSD:       94                           T:          25  QT:         436  QTc:        481    Interpretive Statements  SINUS RHYTHM  No previous ECG available for comparison  Electronically  Signed On 9- 21:19:31 PDT by Lb Bonilla MD     COVID/SARS CoV-2 PCR    Collection Time: 09/20/20  8:25 PM    Specimen: Nasopharyngeal; Respirate   Result Value Ref Range    COVID Order Status Received    SARS-CoV-2, PCR (In-House)    Collection Time: 09/20/20  8:25 PM   Result Value Ref Range    SARS-CoV-2 Source NP Swab        Imaging  DX-CHEST-PORTABLE (1 VIEW)   Final Result      No acute cardiopulmonary abnormality.      CT-CTA NECK WITH & W/O-POST PROCESSING   Final Result      CT angiogram of the neck within normal limits.      CT-CTA HEAD WITH & W/O-POST PROCESS   Final Result      CT angiogram of the [place] within normal limits.      CT-CEREBRAL PERFUSION ANALYSIS   Final Result      1.  Cerebral blood flow less than 30% likely representing completed infarct = 0 mL.      2.  T Max more than 6 seconds likely representing combination of completed infarct and ischemia = 0 mL.      3.  Mismatched volume likely representing ischemic brain/penumbra = None      4.  Please note that the cerebral perfusion was performed on the limited brain tissue around the basal ganglia region. Infarct/ischemia outside the CT perfusion sections can be missed in this study.      CT-HEAD W/O   Final Result      No acute intracranial abnormality.      MR-BRAIN-W/O    (Results Pending)   EC-ECHOCARDIOGRAM COMPLETE W/ CONT    (Results Pending)       Assessment/Plan  Acute CVA (cerebrovascular accident) (HCC)  Assessment & Plan  Acute CVA, no prior hx  Received tpa  Neuro checks q 15 min 2 hours, then q 30 min 6 hours, then q 1 hour 18 hours  CT in 24 hours post tpa, if not bleeding than dvt px and antiplt ok at that time  Keep sbp < 180  MRI  CTA head and neck normal  CT head normal w/o contrast  ? Racing heart rate prior week, cardiac monitoring, echo ordered, ekg sinus    Right sided weakness  Assessment & Plan  From stroke, acute  Facial droop and weakness of right upper ext  She says improves post  tpa      Discussed patient condition and risk of morbidity and/or mortality with RN, RT, Pharmacy, Code status disscussed, Charge nurse / hot rounds, Patient and neurology.      The patient remains critically ill.  She is s/p TPA for acute stroke.  Close neurological monitoring with Q 15 min neuro checks for 2 hours, then q 30 min for hours, then q 1 hour for 18 hours.  Keep sbp less than 180 sbp.  High risk for bleeding post TPA including brain bleeding, clinical deterioration and even death without critical care management and monitoring.  Critical care time = 35 minutes in directly providing and coordinating critical care and extensive data review.  No time overlap and excludes procedures.

## 2020-09-21 NOTE — CARE PLAN
Problem: Communication  Goal: The ability to communicate needs accurately and effectively will improve  Outcome: PROGRESSING AS EXPECTED  NOTE: Patient able to effectively communicate needs.     Problem: Venous Thromboembolism (VTW)/Deep Vein Thrombosis (DVT) Prevention:  Goal: Patient will participate in Venous Thrombosis (VTE)/Deep Vein Thrombosis (DVT)Prevention Measures  Outcome: PROGRESSING AS EXPECTED   NOTE: SCDs in place.

## 2020-09-21 NOTE — ED TRIAGE NOTES
Chief Complaint   Patient presents with   • Possible Stroke     Pt ambulatory to triage with son with steady gait. Pt has notable slurred speech with onset 1 hour ago as well as right side facial droop. Charge RN made aware. Stroke alert called.

## 2020-09-21 NOTE — PROGRESS NOTES
2 RN skin check complete with Paulino RN  Devices in place SPO2 probe and BP cuff  Skin assessed under devices every 2 hours. Devices repositioned every 2 hours.  The following interventions in place: Patient able to reposition self and verbalizes importance.

## 2020-09-21 NOTE — DISCHARGE PLANNING
[facility] Transitional Care Coordination     Referral from:  Dr. Mari Coronelheet indicates:  Medicare/[employer]  Potential Rehab Diagnosis:  Stroke    Chart review indicates patient may have on going medical management, may have therapy needs     D/C support:  To be determined     Physiatry consultation pended per protocol while waiting for additional information.  Received tPA.  Workup ongoing, therapy evals pending.  TCC will follow.         Thank you for the referral.

## 2020-09-21 NOTE — CONSULTS
Neurology STROKE CODE H&P  Neurohospitalist Service, [facility]    Referring Physician: Lb Bonilla M.D.    STROKE CODE:   Chief Complaint   Patient presents with   • Possible Stroke       To obtain the most accurate data regarding the time called, and time patient seen, refer to the stroke run-sheet and chart.  For time of CT, refer to the radiology report. See A&P below for TPA Decision and door to needle time if and when applicable.    HPI: Char Kimbrough is a 74 y.o. woman presenting for whom neurology has been consulted for stroke.  LKN 1830 while at home.  Patient was holding water when she dropped it, noted to have acute onset right sided facial droop, right arm weakness, and inability to produce speech.  Son drove patient to [facility] ED.  No hx of prior stroke nor similar events in the past.  Denies headache, no vertigo, but admits numbness on right compared to left. Denies visual changes.    Review of systems: In addition to what is detailed in the HPI above, all other systems reviewed and are negative.    Past Medical History:   Whipple procedure 14 years ago    FHx:  No hx of stroke    SHx:   reports that she has never smoked. She does not have any smokeless tobacco history on file. She reports that she does not drink alcohol or use drugs.    Allergies:  Allergies   Allergen Reactions   • Percodan-Katherin [Oxycodone-Aspirin]        Medications:    Current Facility-Administered Medications:   •  alteplase (ACTIVASE) BOLUS injection 4.7 mg, 0.09 mg/kg, Intravenous, Once **FOLLOWED BY** alteplase (ACTIVASE) injection 42 mg, 0.81 mg/kg, Intravenous, Once **FOLLOWED BY** NS infusion 50 mL, 50 mL, Intravenous, Once, Villa Mcdonald M.D.  No current outpatient medications on file.    Physical Examination:    Vitals:    09/20/20 1911 09/20/20 1918   BP: 149/81    Pulse: 64    Resp: 16    Temp: 36.5 °C (97.7 °F)    TempSrc: Temporal    SpO2: 94%    Weight:  51.9 kg (114 lb 6.7 oz)   Height:  "1.651 m (5' 5\") 1.651 m (5' 5\")       General: Patient is awake and in no acute distress  Eyes: examination of optic disks not indicated at this time given acuity of consult  CV: RRR    NEUROLOGICAL EXAM:     Mental status: Awake, alert and oriented, follows simple commands  Speech and language: speech is moderately dysarthric. The patient is able to name and repeat making phonemic paraphasic errors; mild expressive aphasia  Cranial nerve exam: Pupils are equal, round and reactive to light bilaterally. Visual fields are full on blink to threat b/l. Extraocular muscles are intact. Sensation in the face is intact to light touch. Face is notable for a right sided facial droop. Hearing to finger rub equal. Palate elevates symmetrically. Shoulder shrug is full. Tongue is midline.  Motor exam: Strength is 5/5 in all extremities both distally and proximally but noted pronator drift and orbiting RUE. Tone is normal. No abnormal movements were seen on exam.  Sensory exam: mildly decreased on right compared to left  Deep tendon reflexes: 2+ and symmetric. Toes down-going bilaterally.  Coordination: no ataxia   Gait: deferred given patient preference    NIH Stroke Scale:    1a. Level of Consciousness (Alert, drowsy, etc): 0= Alert    1b. LOC Questions (Month, age): 0= Answers both correctly    1c. LOC Commands (Open/close eyes make fist/let go): 0= Obeys both correctly    2.   Best Gaze (Eyes open - patient follows examiner's finger on face): 0= Normal    3.   Visual Fields (introduce visual stimulus/threat to patient's field quadrants): 0= No visual loss  4.   Facial Paresis (Show teeth, raise eyebrows and squeeze eyes shut): 1= Minor     5a. Motor Arm - Left (Elevate arm to 90 degrees if patient is sitting, 45 degrees if  supine): 0= No drift    5b. Motor Arm - Right (Elevate arm to 90 degrees if patient is sitting, 45 degrees if supine): 1= Drift    6a. Motor Leg - Left (Elevate leg 30 degrees with patient supine): 0= No " drift    6b. Motor Leg - Right  (Elevate leg 30 degrees with patient supine): 0= No drift    7.   Limb Ataxia (Finger-nose, heel down shin): 0= No ataxia    8.   Sensory (Pin prick to face, arm, trunk and leg - compare side to side): 1= Partial loss    9.  Best Language (Name item, describe a picture and read sentences): 1= Mild to moderate aphasia    10. Dysarthria (Evaluate speech clarity by patient repeating listed words): 1= Mild to moderate slurring    11. Extinction and Inattention (Use information from prior testing to identify neglect or  double simultaneous stimuli testing): 0= No neglect    Total NIH Score: 5    Modified McCulloch Scale (MRS): 3 = Moderate disability; requires some help, but able to walk without assistance    Objective Data:    Labs:  No results found for: PROTHROMBTM, INR   Lab Results   Component Value Date/Time    WBC 6.0 10/02/2018 12:52 PM    RBC 4.21 10/02/2018 12:52 PM    HEMOGLOBIN 12.5 10/02/2018 12:52 PM    HEMATOCRIT 39.2 10/02/2018 12:52 PM    MCV 93.1 10/02/2018 12:52 PM    MCH 29.7 10/02/2018 12:52 PM    MCHC 31.9 (L) 10/02/2018 12:52 PM    MPV 10.0 10/02/2018 12:52 PM    NEUTSPOLYS 58.20 10/02/2018 12:52 PM    LYMPHOCYTES 32.20 10/02/2018 12:52 PM    MONOCYTES 7.40 10/02/2018 12:52 PM    EOSINOPHILS 1.30 10/02/2018 12:52 PM    BASOPHILS 0.70 10/02/2018 12:52 PM      Lab Results   Component Value Date/Time    SODIUM 136 02/16/2016 07:27 AM    POTASSIUM 4.1 02/16/2016 07:27 AM    CHLORIDE 101 02/16/2016 07:27 AM    CO2 28 02/16/2016 07:27 AM    GLUCOSE 91 02/16/2016 07:27 AM    BUN 9 02/16/2016 07:27 AM    CREATININE 0.79 02/16/2016 07:27 AM      Lab Results   Component Value Date/Time    CHOLSTRLTOT 221 (H) 02/16/2016 07:27 AM     (H) 02/16/2016 07:27 AM    HDL 64 02/16/2016 07:27 AM    TRIGLYCERIDE 126 02/16/2016 07:27 AM       Lab Results   Component Value Date/Time    ALKPHOSPHAT 96 02/16/2016 07:27 AM    ASTSGOT 33 02/16/2016 07:27 AM    ALTSGPT 37 02/16/2016 07:27 AM     TBILIRUBIN 0.4 02/16/2016 07:27 AM        Imaging/Testing:    I interpreted and/or reviewed the patient's neuroimaging    CT-CEREBRAL PERFUSION ANALYSIS   Final Result      1.  Cerebral blood flow less than 30% likely representing completed infarct = 0 mL.      2.  T Max more than 6 seconds likely representing combination of completed infarct and ischemia = 0 mL.      3.  Mismatched volume likely representing ischemic brain/penumbra = None      4.  Please note that the cerebral perfusion was performed on the limited brain tissue around the basal ganglia region. Infarct/ischemia outside the CT perfusion sections can be missed in this study.      CT-HEAD W/O   Final Result      No acute intracranial abnormality.      DX-CHEST-PORTABLE (1 VIEW)    (Results Pending)   CT-CTA NECK WITH & W/O-POST PROCESSING    (Results Pending)   CT-CTA HEAD WITH & W/O-POST PROCESS    (Results Pending)     CTA head and neck 9/20/20: no LVO    Assessment and Plan:    Char Kimbrough is a 74 y.o. woman presenting for whom neurology has been consulted for stroke.  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  Patient within the window for tPA.  Differential for etiology includes large vessel disease ie atherosclerotic vs. Cardioembolic.  No contraindications to thrombolytic thereapy, discussed indications, risks, and potential adverse risks prior to bolus.  Not a candidate for IR intervention given no LVO.  Patient enrolled in the FIAT trial and randomized to the research arm (do not perform the repeat CT head at 24hr post thrombolytics).    Plan:    - ACUTE TREATMENT WITH TPA given 1949 9/20/20 as managed with pharmacy and coordinated care  - FIAT Enrollment - research arm - do not perform repeat CT head at 24hr  - Admit to the intensive care unit  - Neurology checks and vital signs per protocol; perform swallow screen  - Maintain blood pressure <180/105 per thrombolytic therapy guidelines  - obtain normoglycemia and  avoid hypo- or hyper -natremia; aim for normothermia  - Hold antiplatelets and any blood thinners for 24 hours status post tPA administration  - high intensity statin per SPARCL Trial if and when safe to swallow  - serum studies for stroke risk factors: lipid panel & hemoglobin A1C  - To identify stroke territory, obtain MRI brain  - Obtain a 2D echocardiogram w/ bubble study  - evaluate and treat with PT/OT/ST  - stroke education    The evaluation of the patient, and recommended management, was discussed with Dr. Lb Bonilla.     Villa Mcdonald MD  Medical Director, Comprehensive Stroke [facility], [facility]  Neurohospitalist, & Vascular Neurology, [facility]  Clinical  of Neurology, [employer]    CRITICAL CARE    Upon my evaluation, this patient had a high probability of imminent or life-threatening deterioration due to cerebrovascular accident which required my direct attention, intervention, and personal management.  I personally provided 35 minutes of total critical care time outside of time spent on separately billable/documented procedures. Time includes: discussion and treatment with tPA at bedside in close coordination with pharmacy, review of laboratory data, review of radiology studies, discussion with consultants, discussion with family/patient, monitoring for potential decompensation.  Interventions were performed as documented in the chart.

## 2020-09-21 NOTE — ED PROVIDER NOTES
"ED Provider Note    CHIEF COMPLAINT  Chief Complaint   Patient presents with   • Possible Stroke       HPI  Char Kimbrough is a 74 y.o. female who presents with a chief complaint of right-sided facial droop that started acutely at 1830.  The patient just returned from [place] and all of a sudden developed a right-sided facial droop with difficulty speaking and spilling fluid out of the side of her mouth.  Her  called their son and he called EMS.  He met them at the house and EMS had already done their evaluation, recommending that she be brought to the emergency department.  Her son brought her to the ER immediately.  She has no history of severe head trauma within the past 3 months, no history of ischemic stroke, intracranial intraparietal surgery in the past 3 months, no history of intra-axial intracranial neoplasm or intracranial hemorrhage.  She is not anticoagulated.  She is not on hemodialysis.  She does not have a history of GI bleed but does have a remote history of pancreatic cancer and underwent a Whipple procedure 14 years ago after which time she was considered cured.  She notes that her heart was \"racing\" last week but otherwise she has felt well.  No fevers, chest pain, shortness of breath, abdominal pain, nausea, vomiting, diarrhea, constipation.    REVIEW OF SYSTEMS  See HPI for further details.  Right-sided facial droop.  Difficulty speaking.  Difficulty swallowing.  All other systems are negative.     PAST MEDICAL HISTORY       SOCIAL HISTORY  Social History     Tobacco Use   • Smoking status: Never Smoker   Substance and Sexual Activity   • Alcohol use: Never     Frequency: Never   • Drug use: Never   • Sexual activity: Not on file       SURGICAL HISTORY  patient denies any surgical history    CURRENT MEDICATIONS  Home Medications    **Home medications have not yet been reviewed for this encounter**         ALLERGIES  Allergies   Allergen Reactions   • Percodan-Katherin " "[Oxycodone-Aspirin]        PHYSICAL EXAM  VITAL SIGNS: /81   Pulse 64   Temp 36.5 °C (97.7 °F) (Temporal)   Resp 16   Ht 1.651 m (5' 5\")   Wt 51.9 kg (114 lb 6.7 oz)   SpO2 94%   BMI 19.04 kg/m²    Pulse ox interpretation: I interpret this pulse ox as normal.  Constitutional: Alert in no apparent distress.  HENT: No signs of trauma, Bilateral external ears normal, Nose normal.  Moist mucous membranes.  Eyes: Pupils are equal and reactive, Conjunctiva normal, Non-icteric.   Neck: Normal range of motion, No tenderness, Supple, No stridor.   Lymphatic: No lymphadenopathy noted.   Cardiovascular: Regular rate and rhythm, no murmurs.   Thorax & Lungs: Normal breath sounds, No respiratory distress, No wheezing, No chest tenderness.   Abdomen: Bowel sounds normal, Soft, No tenderness, No masses, No pulsatile masses. No peritoneal signs.  Skin: Warm, Dry, No erythema, No rash.   Back: Normal alignment.  Extremities: Intact distal pulses, No edema, No tenderness, No cyanosis.  Musculoskeletal: Good range of motion in all major joints. No tenderness to palpation or major deformities noted.   Neurologic: Alert, right-sided facial palsy.  Dysarthria.  NIH stroke scale score of 3.  Psychiatric: Affect normal, Judgment normal, Mood normal.     DIAGNOSTIC STUDIES / PROCEDURES    EKG  Results for orders placed or performed during the hospital encounter of 20   EKG (NOW)   Result Value Ref Range    Report       [facility]    Test Date:  2020  Pt Name:    HUGH LOCKETT                 Department: ER  MRN:        [id number]                      Room:        21  Gender:     Female                       Technician: [id number]  :        1945                   Requested By:ER TRIAGE PROTOCOL  Order #:    [id number]                    Reading MD: Lb Bonilla MD    Measurements  Intervals                                Axis  Rate:       73                           P:          " 38  OR:         176                          QRS:        2  QRSD:       94                           T:          25  QT:         436  QTc:        481    Interpretive Statements  SINUS RHYTHM  No previous ECG available for comparison  Electronically Signed On 9- 21:19:31 PDT by Lb Bonilla MD       LABS  CBC  CMP  Troponin  PT/INR  PTT  Accu-Chek  Urinalysis    RADIOLOGY  CTA head/neck  CT perfusion study  Chest x-ray    COURSE & MEDICAL DECISION MAKING  Pertinent Labs & Imaging studies reviewed. (See chart for details)  This is a 74-year-old female here with acute onset right-sided facial droop, dysphasia, and had numbness in the tongue.  Symptoms are concerning for acute stroke.  Accu-Chek reveals blood sugar of 90.  Patient meets criteria for TPA.  Patient was sent emergently to the CT scanner and there were no obvious abnormalities.  I spoke with the on-call neurologist and we concurred that the patient will receive TPA.  Patient's hemodynamics are within acceptable limits.  She was given TPA and admitted to the intensive care unit in critical condition.    Patient is critically ill.   The patient continues to have: Neurologic deficit  The vital organ system that is affected is the: All are at risk  If untreated there is a high chance of deterioration into: Respiratory arrest, cardiovascular collapse  And eventually death.   The critical care that I am providing today is: Evaluation of patient immediately upon arrival, discussion with multiple consultants including pharmacist, neurologist, and intensivist, interpretation of lab and imaging results, review of prior medical records, discussion with family member, and preparation of the medical record.  The critical that has been undertaken is medically complex.   There has been no overlap in critical care time.   Critical Care Time not including procedures: 35 minutes      FINAL IMPRESSION  1.  CVA    Electronically signed by: Lb Bonilla M.D., 9/20/2020  7:40 PM

## 2020-09-21 NOTE — THERAPY
PT Contact Note    PT order received and acknowledged. Pt with active bedrest orders s/p TPA/thrombectomy beginning 9/20 at 2101 for 24 hours. Will defer PT eval until pt off bedrest and cleared for OOB mobility assessment.     Thank you,  Snow Fish, PT, DPT  Ext. [phone]

## 2020-09-21 NOTE — ASSESSMENT & PLAN NOTE
AIS s/p tpa  Etiological stroke work up ongoing  Neuro checks per tpa protocol  MRI at 24 hours, ASA and pharm DVT PPX at that time if no evidence of hemorrhagic conversion  BP parameters per tpa protocol <180/105

## 2020-09-21 NOTE — PROGRESS NOTES
Neurology Progress Note  Neurohospitalist Service, [facility]    Referring Physician: Manny Guerra M.D.    Chief Complaint   Patient presents with   • Possible Stroke       HPI: Refer to initial documented Neurology H&P, as detailed in the patient's chart.    Interval History 9/21/2020: No acute events overnight. Patient is currently sitting up in bed, awake, alert, fully oriented, and following commands. She states her right sided numbness and weakness has resolved, her speech as improved, and is felling much better today.     Past Medical History:   Past Medical History:   Diagnosis Date   • Pancreatic cancer (HCC)         FHx:  History reviewed. No pertinent family history.     SHx:  Social History     Socioeconomic History   • Marital status: Single     Spouse name: Not on file   • Number of children: Not on file   • Years of education: Not on file   • Highest education level: Not on file   Occupational History   • Not on file   Social Needs   • Financial resource strain: Not on file   • Food insecurity     Worry: Not on file     Inability: Not on file   • Transportation needs     Medical: Not on file     Non-medical: Not on file   Tobacco Use   • Smoking status: Never Smoker   Substance and Sexual Activity   • Alcohol use: Never     Frequency: Never   • Drug use: Never   • Sexual activity: Not on file   Lifestyle   • Physical activity     Days per week: Not on file     Minutes per session: Not on file   • Stress: Not on file   Relationships   • Social connections     Talks on phone: Not on file     Gets together: Not on file     Attends Advent service: Not on file     Active member of club or organization: Not on file     Attends meetings of clubs or organizations: Not on file     Relationship status: Not on file   • Intimate partner violence     Fear of current or ex partner: Not on file     Emotionally abused: Not on file     Physically abused: Not on file     Forced sexual  activity: Not on file   Other Topics Concern   • Not on file   Social History Narrative   • Not on file        Medications:    Current Facility-Administered Medications:   •  aspirin (ASA) tablet 325 mg, 325 mg, Oral, DAILY **OR** aspirin (ASA) chewable tab 324 mg, 324 mg, Oral, DAILY **OR** aspirin (ASA) suppository 300 mg, 300 mg, Rectal, DAILY, ZACK Acosta  •  senna-docusate (PERICOLACE or SENOKOT S) 8.6-50 MG per tablet 2 Tab, 2 Tab, Oral, BID, Stopped at 09/20/20 2115 **AND** polyethylene glycol/lytes (MIRALAX) PACKET 1 Packet, 1 Packet, Oral, QDAY PRN **AND** magnesium hydroxide (MILK OF MAGNESIA) suspension 30 mL, 30 mL, Oral, QDAY PRN **AND** bisacodyl (DULCOLAX) suppository 10 mg, 10 mg, Rectal, QDAY PRN, Manny Guerra M.D.  •  ondansetron (ZOFRAN) syringe/vial injection 4 mg, 4 mg, Intravenous, Q4HRS PRN, Manny Guerra M.D.  •  ondansetron (ZOFRAN ODT) dispertab 4 mg, 4 mg, Oral, Q4HRS PRN, Manny Guerra M.D.  •  NS infusion, , Intravenous, Continuous, Manny Guerra M.D., Stopped at 09/21/20 1000  •  labetalol (NORMODYNE/TRANDATE) injection 10 mg, 10 mg, Intravenous, Q4HRS PRN **OR** enalaprilat (VASOTEC) injection 1.25 mg, 1.25 mg, Intravenous, Q6HRS PRN, Manny Guerra M.D.  •  brimonidine (ALPHAGAN) 0.2 % ophthalmic solution 1 Drop, 1 Drop, Right Eye, BID, Manny Guerra M.D., 1 Drop at 09/21/20 0614  •  timolol (TIMOPTIC) 0.5 % ophthalmic solution 1 Drop, 1 Drop, Right Eye, QAM, Manny Guerra M.D., 1 Drop at 09/21/20 0614  •  atorvastatin (LIPITOR) tablet 80 mg, 80 mg, Oral, Q EVENING, Manny Guerra M.D., Stopped at 09/20/20 9337    Allergies:  Allergies   Allergen Reactions   • Percodan-Katherin [Oxycodone-Aspirin]         Review of systems:  Constitutional: denies fever, night sweats, weight loss.   Eyes: denies acute vision change, eye pain or secretion.   Ears, Nose, Mouth, Throat: denies nasal secretion, nasal bleeding, difficulty swallowing, hearing loss, tinnitus, vertigo, ear  pain, acute dental problems, oral ulcers or lesions.   Endocrine: denies recent weight changes, heat or cold intolerance, polyuria, polydypsia, polyphagia,abnormal hair growth.  Cardiovascular: denies new onset of chest pain, palpitations, syncope, or dyspnea of exertion.  Pulmonary: denies shortness of breath, new onset of cough, hemoptysis, wheezing, chest pain or flu-like symptoms.   GI: denies nausea, vomiting, diarrhea, GI bleeding, change in appetite, abdominal pain, and change in bowel habits.  : denies dysuria, urinary incontinence, hematuria.  Heme/oncology: denies history of easy bruising or bleeding. No history of cancer, DVTor PE.  Allergy/immunology: denies hives/urticaria, or itching.   Dermatologic: denies new rash, or new skin lesions.  Musculoskeletal:denies joint swelling or pain, muscle pain, neck and back pain.   Neurologic: See interval history; denies headaches, acute visual changes, ataxia,  memory loss, abnormal movements, seizures, loss of consciousness, or episodes of confusion.   Psychiatric: denies symptoms of depression, anxiety, hallucinations, mood swings or changes, suicidal or homicidal thoughts.     Physical Examination:   Vitals:    09/21/20 1400 09/21/20 1500 09/21/20 1600 09/21/20 1700   BP: 143/68 132/76 134/68    Pulse: 66 63 (!) 59    Resp: 19 19 (!) 25 18   Temp: 36.7 °C (98.1 °F)  36.7 °C (98 °F)    TempSrc: Temporal      SpO2: 92% 94% 93%    Weight:       Height:         General: Patient in no acute distress, pleasant and cooperative.  HEENT: Normocephalic, no signs of acute trauma.   Neck: supple, no meningeal signs or carotid bruits. There is normal range of motion. No tenderness on exam.   Chest: clear to auscultation. No cough.   CV: RRR, no murmurs.   Skin: no signs of acute rashes or trauma.   Musculoskeletal: joints exhibit full range of motion, without any pain to palpation. There are no signs of joint or muscle swelling. There is no tenderness to deep palpation  of muscles.   Psychiatric: No hallucinatory behavior. Denies symptoms of depression or suicidal ideation. Mood and affect appear normal on exam.     NEUROLOGICAL EXAM:   Mental status, orientation: Awake, alert, fully oriented, and following commands.   Speech and language: Speech is mildly dysarthric and fluent. The patient is able to name, repeat with only one phonemic paraphasic error, and comprehend.   Memory: There is intact recollection of recent and remote events.   Cranial nerve exam: Pupils are 3-4 mm bilaterally and equally reactive to light and accommodation. Visual fields are full to field test. There is mild left horizontal nystagmus that extinguishes spontaneously on primary or secondary gaze. Intact full EOM in all directions of gaze. Decreased hearing to right compared to left at baseline. Face appears asymmetric with right nasolabial fold flattening. Sensation in the face is intact to light touch. Uvula is midline. Palate elevates symmetrically. Tongue is midline and without any signs of tongue biting or fasciculations. Sternocleidomastoid muscles exhibit is normal strength bilaterally. Shoulder shrug is intact bilaterally.   Motor exam: Strength is 5/5 in all extremities. Tone is normal. No abnormal movements were seen on exam.   Sensory exam Reveals normal sense of light touch and pinprick in all extremities.   Deep tendon reflexes:  2+ throughout. Plantar responses are flexor. There is no clonus.   Coordination: No ataxia, shows a normal finger-nose-finger and heel-down-shin. Normal rapidly alternating movements.   Gait: Deferred given patient preference.     NIH Stroke Scale  9/21/20 FIAT study patient participant    1a. Level of Consciousness (Alert, drowsy, etc): 0= Alert    1b. LOC Questions (Month, age): 0= Answers both correctly    1c. LOC Commands (Open/close eyes make fist/let go): 0= Obeys both correctly    2.   Best Gaze (Eyes open - patient follows examiner's finger on face): 0=  Normal    3.   Visual Fields (introduce visual stimulus/threat to patient's field quadrants): 0= No visual loss    4.   Facial Paresis (Show teeth, raise eyebrows and squeeze eyes shut): 1= Minor     5a. Motor Arm - Left (Elevate arm to 90 degrees if patient is sitting, 45 degrees if  supine): 0= No drift    5b. Motor Arm - Right (Elevate arm to 90 degrees if patient is sitting, 45 degrees if supine): 0= No drift    6a. Motor Leg - Left (Elevate leg 30 degrees with patient supine): 0= No drift    6b. Motor Leg - Right  (Elevate leg 30 degrees with patient supine): 0= No drift    7.   Limb Ataxia (Finger-nose, heel down shin): 0= No ataxia    8.   Sensory (Pin prick to face, arm, trunk and leg - compare side to side): 0= Normal    9.  Best Language (Name item, describe a picture and read sentences): 1= Mild to moderate aphasia    10. Dysarthria (Evaluate speech clarity by patient repeating listed words): 1= Mild to moderate slurring    11. Extinction and Inattention (Use information from prior testing to identify neglect or  double simultaneous stimuli testing): 0= No neglect    Total NIH Score: 3    Modified [place] Scale (MRS): 3 = Moderate disability; requires some help, but able to walk without assistance      Ancillary Data Reviewed:    Labs:  Lab Results   Component Value Date/Time    PROTHROMBTM 12.0 09/20/2020 07:31 PM    INR 0.87 09/20/2020 07:31 PM      Lab Results   Component Value Date/Time    WBC 5.2 09/21/2020 03:30 AM    RBC 3.74 (L) 09/21/2020 03:30 AM    HEMOGLOBIN 11.5 (L) 09/21/2020 03:30 AM    HEMATOCRIT 34.8 (L) 09/21/2020 03:30 AM    MCV 93.0 09/21/2020 03:30 AM    MCH 30.7 09/21/2020 03:30 AM    MCHC 33.0 (L) 09/21/2020 03:30 AM    MPV 9.9 09/21/2020 03:30 AM    NEUTSPOLYS 61.50 09/20/2020 10:10 PM    LYMPHOCYTES 27.90 09/20/2020 10:10 PM    MONOCYTES 8.40 09/20/2020 10:10 PM    EOSINOPHILS 1.70 09/20/2020 10:10 PM    BASOPHILS 0.30 09/20/2020 10:10 PM      Lab Results   Component Value  Date/Time    SODIUM 136 09/21/2020 03:30 AM    POTASSIUM 4.2 09/21/2020 03:30 AM    CHLORIDE 101 09/21/2020 03:30 AM    CO2 23 09/21/2020 03:30 AM    GLUCOSE 90 09/21/2020 03:30 AM    BUN 11 09/21/2020 03:30 AM    CREATININE 0.47 (L) 09/21/2020 03:30 AM      Lab Results   Component Value Date/Time    CHOLSTRLTOT 223 (H) 09/20/2020 10:10 PM     (H) 09/20/2020 10:10 PM     09/20/2020 10:10 PM    TRIGLYCERIDE 60 09/20/2020 10:10 PM       Lab Results   Component Value Date/Time    ALKPHOSPHAT 95 09/20/2020 07:31 PM    ASTSGOT 25 09/20/2020 07:31 PM    ALTSGPT 20 09/20/2020 07:31 PM    TBILIRUBIN 0.5 09/20/2020 07:31 PM        Imaging/Testing:    I interpreted and/or reviewed the patient's neuroimaging    EC-ECHOCARDIOGRAM COMPLETE W/O CONT   Final Result      DX-CHEST-PORTABLE (1 VIEW)   Final Result      No acute cardiopulmonary abnormality.      CT-CTA NECK WITH & W/O-POST PROCESSING   Final Result      CT angiogram of the neck within normal limits.      CT-CTA HEAD WITH & W/O-POST PROCESS   Final Result      CT angiogram of the [place] within normal limits.      CT-CEREBRAL PERFUSION ANALYSIS   Final Result      1.  Cerebral blood flow less than 30% likely representing completed infarct = 0 mL.      2.  T Max more than 6 seconds likely representing combination of completed infarct and ischemia = 0 mL.      3.  Mismatched volume likely representing ischemic brain/penumbra = None      4.  Please note that the cerebral perfusion was performed on the limited brain tissue around the basal ganglia region. Infarct/ischemia outside the CT perfusion sections can be missed in this study.      CT-HEAD W/O   Final Result      No acute intracranial abnormality.      MR-BRAIN-W/O    (Results Pending)       Assessment:    Char Kimbrough is a 74 y.o. female with relevant history of pancreatic cancer s/p Whipple procedure presenting on 9/20/20 for left MCA syndrome (aphasia, dysarthria, right facial weakness,  and right sided weakness and numbness) whom neurology was consulted to address acute ischemic stroke. CT head showed no acute abnormalities. CTA head and neck were both normal, with no LVO seen. There were no contraindications for thrombolytic therapy, thus patient was given tPA on 9/20/20 at 19:49. She was not a candidate for IR thrombectomy given no LVO seen on vessel imaging. Patient was enrolled in the FIAT trial and randomized to the research arm. Neurological exam today improved since treatment with tPA, NIHSS 3 significant only for right nasolabial fold flattening, slight dysarthria, and mild expressive aphasia with phonemic error when asked to repeat. Etiology of acute ischemic stroke is likely large vessel disease (ie atherosclerosis) versus cardioembolic.     Plan:    -Obtain MRI Brain wo contrast within 24 hours post tPA administration.  - FIAT Enrollment - research arm - do not perform repeat CT head 24 hours post tPA  -Neuro assessment and VS per post tPA protocol.   -BP goal < 180/105. Antihypertensives per ICU team.   -No anticoagulation/anti thrombotics x 24 hours post tPA, and post tPA imaging is without hemorrhagic conversion. May then plan to initiate ASA.   -Telemetry; currently SR. Screen for Afib/arrhythmia.   -TTE revealed EF 65% and moderate left atrial dilation. These findings are suggestive of possible Afib.  -Outpatient extended cardiac event monitoring for afib (eg. Zio patch vs. Loop)  -Atorvastatin 80 mg PO q HS. Note lipid panel:  (goal <100),  (within goal>40) .   -Recommend aggressive BG management per primary team. Avoid IVF with Dextrose. BG goal 140-180. Note hemoglobin A1c: 5.9 (within goal <6.4).   -PT/OT/SLP eval and treat. See notes for recommendations, respectively.   -Will follow up with results of the above and make additional recommendations accordingly. All other medical management per primary/ICU team.   -DVT PPX: SCDs.   -[facility]. Referral placed.     The evaluation of the patient, and recommended management, was discussed with Dr. Mcdonald, Dr. Moore, and bedside RN. I have performed a physical exam and reviewed and updated ROS and Plan today (9/21/2020). In review of yesterday's note (9/20/2020), there are no changes except as documented above.    NATTY Acosta.   Nurse Practitioner, Neurohospitalist  [facility]  t) [phone] (f) [phone]

## 2020-09-21 NOTE — ED NOTES
Bedside report to HANS Aguilar neuro exam completed at bedside  Patient to R104/00 with RN and tech on cardiac monitor  VSS

## 2020-09-21 NOTE — PROGRESS NOTES
Bedside report and neuro exam completed with Carly MAXWELL. Patient transported to R104 via hospital bed on monitor with CCT. POC discussed with patient and son, Reji. Reji to be designated support person. Patient and family educated on visitation policy. All questions answered.

## 2020-09-22 ENCOUNTER — PATIENT OUTREACH (OUTPATIENT)
Dept: HEALTH INFORMATION MANAGEMENT | Facility: OTHER | Age: 75
End: 2020-09-22

## 2020-09-22 VITALS
RESPIRATION RATE: 18 BRPM | BODY MASS INDEX: 19.14 KG/M2 | OXYGEN SATURATION: 92 % | SYSTOLIC BLOOD PRESSURE: 135 MMHG | WEIGHT: 114.86 LBS | TEMPERATURE: 97.3 F | DIASTOLIC BLOOD PRESSURE: 63 MMHG | HEART RATE: 55 BPM | HEIGHT: 65 IN

## 2020-09-22 PROCEDURE — 700111 HCHG RX REV CODE 636 W/ 250 OVERRIDE (IP): Performed by: PSYCHIATRY & NEUROLOGY

## 2020-09-22 PROCEDURE — 99232 SBSQ HOSP IP/OBS MODERATE 35: CPT | Performed by: NURSE PRACTITIONER

## 2020-09-22 PROCEDURE — 97165 OT EVAL LOW COMPLEX 30 MIN: CPT

## 2020-09-22 PROCEDURE — 99239 HOSP IP/OBS DSCHRG MGMT >30: CPT | Performed by: HOSPITALIST

## 2020-09-22 PROCEDURE — 97161 PT EVAL LOW COMPLEX 20 MIN: CPT

## 2020-09-22 PROCEDURE — 700111 HCHG RX REV CODE 636 W/ 250 OVERRIDE (IP): Performed by: INTERNAL MEDICINE

## 2020-09-22 RX ORDER — ATORVASTATIN CALCIUM 80 MG/1
80 TABLET, FILM COATED ORAL EVERY EVENING
Qty: 30 TAB | Refills: 1 | Status: SHIPPED | OUTPATIENT
Start: 2020-09-22

## 2020-09-22 RX ADMIN — ENOXAPARIN SODIUM 40 MG: 40 INJECTION SUBCUTANEOUS at 08:36

## 2020-09-22 RX ADMIN — BRIMONIDINE TARTRATE 1 DROP: 2 SOLUTION OPHTHALMIC at 05:57

## 2020-09-22 RX ADMIN — TIMOLOL MALEATE 1 DROP: 5 SOLUTION/ DROPS OPHTHALMIC at 05:57

## 2020-09-22 ASSESSMENT — COGNITIVE AND FUNCTIONAL STATUS - GENERAL
WALKING IN HOSPITAL ROOM: A LITTLE
SUGGESTED CMS G CODE MODIFIER MOBILITY: CJ
MOBILITY SCORE: 21
CLIMB 3 TO 5 STEPS WITH RAILING: A LITTLE
DAILY ACTIVITIY SCORE: 24
STANDING UP FROM CHAIR USING ARMS: A LITTLE
SUGGESTED CMS G CODE MODIFIER DAILY ACTIVITY: CH

## 2020-09-22 ASSESSMENT — GAIT ASSESSMENTS
DISTANCE (FEET): 250
GAIT LEVEL OF ASSIST: SUPERVISED

## 2020-09-22 ASSESSMENT — ACTIVITIES OF DAILY LIVING (ADL): TOILETING: INDEPENDENT

## 2020-09-22 NOTE — CARE PLAN
Problem: Communication  Goal: The ability to communicate needs accurately and effectively will improve  Outcome: PROGRESSING AS EXPECTED     Problem: Safety  Goal: Will remain free from injury  Outcome: PROGRESSING AS EXPECTED     Problem: Venous Thromboembolism (VTW)/Deep Vein Thrombosis (DVT) Prevention:  Goal: Patient will participate in Venous Thrombosis (VTE)/Deep Vein Thrombosis (DVT)Prevention Measures  Outcome: PROGRESSING AS EXPECTED  Note: SCDs while in bed.     Problem: Bowel/Gastric:  Goal: Normal bowel function is maintained or improved  Outcome: PROGRESSING AS EXPECTED  Note: Pt had BM today.     Problem: Knowledge Deficit  Goal: Knowledge of disease process/condition, treatment plan, diagnostic tests, and medications will improve  Outcome: PROGRESSING AS EXPECTED  Note: Son at bedside. Updated on poc. All questions/concerns addressed.

## 2020-09-22 NOTE — PROGRESS NOTES
AIS s/p tpa. MRI with small acute infarct left frontoparietal region. On ASA, statin, Lovenox for pharm DVT PPX. PT/OT/SLP following. Echo with EF 65% and dilated left atrium. No signs of afib on EKG or tele. Likely needs outpatient cardiac monitoring. No ICU needs. Tx to floor. Signed out to triage officer.

## 2020-09-22 NOTE — THERAPY
Physical Therapy   Initial Evaluation     Patient Name: Char Kimbrough  Age:  74 y.o., Sex:  female  Medical Record #: [id number]  Today's Date: 9/22/2020     Precautions: (BP <180/105)    Assessment  Patient is 74 y.o. female presenting acutely withL MCA syndrome now s/p tPA. Pt demonstrating good strength BLE and reports no sensory deficit. She is demonstrating good fxnl mob amb without AD and no LOB with equal step-through gait pattern and good patito. No further acute PT needs at this time. Patient will not be actively followed for physical therapy services at this time, however may be seen if requested by physician for 1 more visit within 30 days to address any discharge or equipment needs.    Plan    Recommend Physical Therapy for Evaluation only     DC Equipment Recommendations: None  Discharge Recommendations: Anticipate that the patient will have no further physical therapy needs after discharge from the hospital     Objective     09/22/20 0816   Prior Living Situation   Prior Services Home-Independent   Housing / Facility 1 Story Apartment / Condo   Elevator Yes   Equipment Owned None   Lives with - Patient's Self Care Capacity Spouse   Prior Level of Functional Mobility   Bed Mobility Independent   Transfer Status Independent   Ambulation Independent   Distance Ambulation (Feet)   (community ambulator)   Assistive Devices Used None   Comments Pt reports being very active prior ambulating up to 3 miles/day   Cognition    Comments pleasant and cooperative   Active ROM Lower Body    Active ROM Lower Body  WDL   Strength Lower Body   Comments BLE MMT 5/5   Sensation Lower Body   Lower Extremity Sensation   WDL   Lower Body Muscle Tone   Lower Body Muscle Tone  WDL   Neurological Concerns   Neurological Concerns No   Vision   Vision Comments No visual deficits reported   Balance Assessment   Sitting Balance (Static) Good   Sitting Balance (Dynamic) Good   Standing Balance (Static) Fair +   Standing Balance  (Dynamic) Fair +   Comments no AD, no overt LOB   Gait Analysis   Gait Level Of Assist Supervised   Assistive Device None   Distance (Feet) 250   # of Stairs Climbed 0   Weight Bearing Status No restrictions   Comments Equal step-through gait pattern, good balance with directional changes, good patito   Bed Mobility    Supine to Sit Supervised  (HOB flat no bedrail)   Functional Mobility   Bed, Chair, Wheelchair Transfer Supervised

## 2020-09-22 NOTE — PROGRESS NOTES
Patient IV removed, discharge instructions reviewed and signed.  Discharged home safe in care of family.

## 2020-09-22 NOTE — THERAPY
Occupational Therapy   Initial Evaluation     Patient Name: Char Kimbrough  Age:  74 y.o., Sex:  female  Medical Record #: [id number]  Today's Date: 9/22/2020     Precautions  Precautions: (BP <180/105)    Assessment  Patient is 74 y.o. female with a diagnosis of SDH. Pt is at or near his/her functional baseline. Pt with no further skilled OT needs in the acute care setting at this time.       Plan     Occupational Therapy for Evaluation only     Discharge Recommendations: (P) Anticipate that the patient will have no further occupational therapy needs after discharge from the hospital        09/22/20 0811   Prior Living Situation   Prior Services Home-Independent   Housing / Facility 1 Story Apartment / Condo   Elevator Yes   Equipment Owned None   Lives with - Patient's Self Care Capacity Spouse   Prior Level of ADL Function   Grooming / Hygiene Independent   Bathing Independent   Dressing Independent   Toileting Independent   ADL Assessment   Grooming Supervision   Upper Body Dressing Supervision   Lower Body Dressing Supervision   Toileting Supervision   Functional Mobility   Sit to Stand Supervised   Bed, Chair, Wheelchair Transfer Supervised   Session Information   Priority 0

## 2020-09-22 NOTE — DISCHARGE PLANNING
Follow up for rehab current documentation does not support therapy need for IRF level of care. Anticipate home with outpatient follow up when medically cleared. May benefit from outpatient physiatry with Dr. Mckeon ext [phone]. No physiatry consult ordered at this time per protocol.

## 2020-09-22 NOTE — DISCHARGE PLANNING
· RN HILARIO was notified that Pt needs new cardiology appointment to obtain Zio Patch recorder  · HANS RICH called Nasrin at extension [phone] to schedule appointment   · Nasrin able to schedule Pt for cardiology appointment and [facility] appointment (date and time will appear on AVS)  · Nasrin still working on PCP appointment   · Bedside RN updated

## 2020-09-22 NOTE — PROGRESS NOTES
Neurology Progress Note  Neurohospitalist Service, [facility]    Referring Physician: Johnnie Hadley MD    Chief Complaint   Patient presents with   • Possible Stroke       HPI: Refer to initial documented Neurology H&P, as detailed in the patient's chart.    Interval History 9/22/2020: No acute events overnight. Patient currently sitting up in chair, awake, alert, fully oriented, and following commands. Persistent right facial droop, mild expressive aphasia, and slight right pronator drift on exam today. Dysarthria appears resolved. She denies headache, vision changes, numbness, tingling, dizziness, lightheadedness, or confusion.     Past Medical History:   Past Medical History:   Diagnosis Date   • Pancreatic cancer (HCC)         FHx:  History reviewed. No pertinent family history.     SHx:  Social History     Socioeconomic History   • Marital status: Single     Spouse name: Not on file   • Number of children: Not on file   • Years of education: Not on file   • Highest education level: Not on file   Occupational History   • Not on file   Social Needs   • Financial resource strain: Not on file   • Food insecurity     Worry: Not on file     Inability: Not on file   • Transportation needs     Medical: Not on file     Non-medical: Not on file   Tobacco Use   • Smoking status: Never Smoker   Substance and Sexual Activity   • Alcohol use: Never     Frequency: Never   • Drug use: Never   • Sexual activity: Not on file   Lifestyle   • Physical activity     Days per week: Not on file     Minutes per session: Not on file   • Stress: Not on file   Relationships   • Social connections     Talks on phone: Not on file     Gets together: Not on file     Attends [employer] service: Not on file     Active member of club or organization: Not on file     Attends meetings of clubs or organizations: Not on file     Relationship status: Not on file   • Intimate partner violence     Fear of current or ex  partner: Not on file     Emotionally abused: Not on file     Physically abused: Not on file     Forced sexual activity: Not on file   Other Topics Concern   • Not on file   Social History Narrative   • Not on file        Medications:    Current Facility-Administered Medications:   •  enoxaparin (LOVENOX) inj 40 mg, 40 mg, Subcutaneous, DAILY, Colin Moore M.D., 40 mg at 09/22/20 0836  •  aspirin (ASA) tablet 325 mg, 325 mg, Oral, DAILY, 325 mg at 09/21/20 2051 **OR** aspirin (ASA) chewable tab 324 mg, 324 mg, Oral, DAILY **OR** aspirin (ASA) suppository 300 mg, 300 mg, Rectal, DAILY, ZACK Acosta  •  senna-docusate (PERICOLACE or SENOKOT S) 8.6-50 MG per tablet 2 Tab, 2 Tab, Oral, BID, Stopped at 09/20/20 2115 **AND** polyethylene glycol/lytes (MIRALAX) PACKET 1 Packet, 1 Packet, Oral, QDAY PRN **AND** magnesium hydroxide (MILK OF MAGNESIA) suspension 30 mL, 30 mL, Oral, QDAY PRN **AND** bisacodyl (DULCOLAX) suppository 10 mg, 10 mg, Rectal, QDAY PRN, Manny Guerra M.D.  •  ondansetron (ZOFRAN) syringe/vial injection 4 mg, 4 mg, Intravenous, Q4HRS PRN, Manny Guerra M.D.  •  ondansetron (ZOFRAN ODT) dispertab 4 mg, 4 mg, Oral, Q4HRS PRN, Manny Guerra M.D.  •  labetalol (NORMODYNE/TRANDATE) injection 10 mg, 10 mg, Intravenous, Q4HRS PRN **OR** enalaprilat (VASOTEC) injection 1.25 mg, 1.25 mg, Intravenous, Q6HRS PRN, Manny Guerra M.D.  •  brimonidine (ALPHAGAN) 0.2 % ophthalmic solution 1 Drop, 1 Drop, Right Eye, BID, Manny Guerra M.D., 1 Drop at 09/22/20 0557  •  timolol (TIMOPTIC) 0.5 % ophthalmic solution 1 Drop, 1 Drop, Right Eye, QAM, Manny Guerra M.D., 1 Drop at 09/22/20 0557  •  atorvastatin (LIPITOR) tablet 80 mg, 80 mg, Oral, Q EVENING, Manny Guerra M.D., 80 mg at 09/21/20 2050    Allergies:  Allergies   Allergen Reactions   • Percodan-Katherin [Oxycodone-Aspirin]         Review of systems:   Constitutional: denies fever, night sweats, weight loss.   Eyes: denies eye pain or  secretion.   Ears, Nose, Mouth, Throat: denies nasal secretion, nasal bleeding, difficulty swallowing, hearing loss, tinnitus, vertigo, ear pain, acute dental problems, oral ulcers or lesions.   Endocrine: denies recent weight changes, heat or cold intolerance, polyuria, polydypsia, polyphagia,abnormal hair growth.  Cardiovascular: denies new onset of chest pain, palpitations, syncope, or dyspnea of exertion.  Pulmonary: denies shortness of breath, new onset of cough, hemoptysis, wheezing, chest pain or flu-like symptoms.   GI: denies nausea, vomiting, diarrhea, GI bleeding, change in appetite, abdominal pain, and change in bowel habits.  : denies dysuria, urinary incontinence, hematuria.  Heme/oncology: denies history of easy bruising or bleeding. No history of cancer, DVTor PE.  Allergy/immunology: denies hives/urticaria, or itching.   Dermatologic: denies new rash, or new skin lesions.  Musculoskeletal:denies joint swelling or pain, muscle pain, neck and back pain.   Neurologic: See interval history.   Psychiatric: denies symptoms of depression, anxiety, hallucinations, mood swings or changes, suicidal or homicidal thoughts.     Physical Examination:  Vitals:    09/22/20 0600 09/22/20 0700 09/22/20 0800 09/22/20 0900   BP: 111/61 103/55 114/59 114/59   Pulse: (!) 52 (!) 43 (!) 54 66   Resp: 19 13 18 18   Temp: 36.8 °C (98.2 °F)  36.1 °C (96.9 °F)    TempSrc: Temporal  Temporal    SpO2: 95% 97% 96% 93%   Weight:       Height:         General: Patient in no acute distress, pleasant and cooperative.  HEENT: Normocephalic, no signs of acute trauma.   Neck: Supple, no meningeal signs or carotid bruits. There is normal range of motion. No tenderness on exam.   Chest: Clear to auscultation. No cough.   CV: RRR, no murmurs.   Skin: No signs of acute rashes or trauma.   Musculoskeletal: Joints exhibit full range of motion, without any pain to palpation. There are no signs of joint or muscle swelling. There is no  tenderness to deep palpation of muscles.   Psychiatric: No hallucinatory behavior. Denies symptoms of depression or suicidal ideation. Mood and affect appear normal on exam.     NEUROLOGICAL EXAM:   Mental status, orientation: Awake, alert, fully oriented, and following commands.   Speech and language: Speech is clear and mildly aphasic. The patient is able to name, repeatwith only one paraphasic error, and comprehend.   Memory: There is intact recollection of recent and remote events.   Cranial nerve exam: Pupils are 3-4 mm bilaterally and equally reactive to light and accommodation. Visual fields are full to field test. There is no nystagmus on primary or secondary gaze. Intact full EOM in all directions of gaze. Face appears asymmetric with right nasolabial fold flattening. Sensation in the face is intact to light touch. Uvula is midline. Palate elevates symmetrically. Tongue is midline and without any signs of tongue biting or fasciculations. Sternocleidomastoid muscles exhibit is normal strength bilaterally. Shoulder shrug is intact bilaterally.   Motor exam: Strength is 5/5 in all extremities, slight right pronator drift. Tone is normal. No abnormal movements were seen on exam.   Sensory exam Reveals normal sense of light touch and pinprick in all extremities.   Deep tendon reflexes:  2+ throughout. Plantar responses are flexor. There is no clonus.   Coordination: shows a normal finger-nose-finger. Normal rapidly alternating movements.   Gait: The patient was able to get up from seated position on first attempt without requiring assistance. Found to be steady when walking. Movements were fluid with normal arm swing. The patient was able to turn without difficulties or tendency to fall. Romberg examination negative.    NIH Stroke Scale  9/22/20    1a. Level of Consciousness (Alert, drowsy, etc): 0= Alert    1b. LOC Questions (Month, age): 0= Answers both correctly    1c. LOC Commands (Open/close eyes make  fist/let go): 0= Obeys both correctly    2.   Best Gaze (Eyes open - patient follows examiner's finger on face): 0= Normal    3.   Visual Fields (introduce visual stimulus/threat to patient's field quadrants): 0= No visual loss    4.   Facial Paresis (Show teeth, raise eyebrows and squeeze eyes shut): 1= Minor     5a. Motor Arm - Left (Elevate arm to 90 degrees if patient is sitting, 45 degrees if  supine): 0= No drift    5b. Motor Arm - Right (Elevate arm to 90 degrees if patient is sitting, 45 degrees if supine): 1= Drift    6a. Motor Leg - Left (Elevate leg 30 degrees with patient supine): 0= No drift    6b. Motor Leg - Right  (Elevate leg 30 degrees with patient supine): 0= No drift    7.   Limb Ataxia (Finger-nose, heel down shin): 0= No ataxia    8.   Sensory (Pin prick to face, arm, trunk and leg - compare side to side): 0= Normal    9.  Best Language (Name item, describe a picture and read sentences): 1= Mild to moderate aphasia    10. Dysarthria (Evaluate speech clarity by patient repeating listed words): 0= Normal articulation    11. Extinction and Inattention (Use information from prior testing to identify neglect or  double simultaneous stimuli testing): 0= No neglect    Total NIH Score: 3          Ancillary Data Reviewed:    Labs:  Lab Results   Component Value Date/Time    PROTHROMBTM 12.0 09/20/2020 07:31 PM    INR 0.87 09/20/2020 07:31 PM      Lab Results   Component Value Date/Time    WBC 5.2 09/21/2020 03:30 AM    RBC 3.74 (L) 09/21/2020 03:30 AM    HEMOGLOBIN 11.5 (L) 09/21/2020 03:30 AM    HEMATOCRIT 34.8 (L) 09/21/2020 03:30 AM    MCV 93.0 09/21/2020 03:30 AM    MCH 30.7 09/21/2020 03:30 AM    MCHC 33.0 (L) 09/21/2020 03:30 AM    MPV 9.9 09/21/2020 03:30 AM    NEUTSPOLYS 61.50 09/20/2020 10:10 PM    LYMPHOCYTES 27.90 09/20/2020 10:10 PM    MONOCYTES 8.40 09/20/2020 10:10 PM    EOSINOPHILS 1.70 09/20/2020 10:10 PM    BASOPHILS 0.30 09/20/2020 10:10 PM      Lab Results   Component Value Date/Time     SODIUM 136 09/21/2020 03:30 AM    POTASSIUM 4.2 09/21/2020 03:30 AM    CHLORIDE 101 09/21/2020 03:30 AM    CO2 23 09/21/2020 03:30 AM    GLUCOSE 90 09/21/2020 03:30 AM    BUN 11 09/21/2020 03:30 AM    CREATININE 0.47 (L) 09/21/2020 03:30 AM      Lab Results   Component Value Date/Time    CHOLSTRLTOT 223 (H) 09/20/2020 10:10 PM     (H) 09/20/2020 10:10 PM     09/20/2020 10:10 PM    TRIGLYCERIDE 60 09/20/2020 10:10 PM       Lab Results   Component Value Date/Time    ALKPHOSPHAT 95 09/20/2020 07:31 PM    ASTSGOT 25 09/20/2020 07:31 PM    ALTSGPT 20 09/20/2020 07:31 PM    TBILIRUBIN 0.5 09/20/2020 07:31 PM        Imaging/Testing:    I interpreted and/or reviewed the patient's neuroimaging    MR-BRAIN-W/O   Final Result      1.  Small sized acute infarct in the left frontoparietal junction. No evidence of hemorrhagic transformation.   2.  Mild diffuse cerebral substance loss.   3.  Mild microangiopathic ischemic change versus demyelination or gliosis.      EC-ECHOCARDIOGRAM COMPLETE W/O CONT   Final Result      DX-CHEST-PORTABLE (1 VIEW)   Final Result      No acute cardiopulmonary abnormality.      CT-CTA NECK WITH & W/O-POST PROCESSING   Final Result      CT angiogram of the neck within normal limits.      CT-CTA HEAD WITH & W/O-POST PROCESS   Final Result      CT angiogram of the [place] within normal limits.      CT-CEREBRAL PERFUSION ANALYSIS   Final Result      1.  Cerebral blood flow less than 30% likely representing completed infarct = 0 mL.      2.  T Max more than 6 seconds likely representing combination of completed infarct and ischemia = 0 mL.      3.  Mismatched volume likely representing ischemic brain/penumbra = None      4.  Please note that the cerebral perfusion was performed on the limited brain tissue around the basal ganglia region. Infarct/ischemia outside the CT perfusion sections can be missed in this study.      CT-HEAD W/O   Final Result      No acute intracranial  abnormality.          Assessment:    Char Kimbrough is a 74 y.o. female with relevant history of pancreatic cancer s/p Whipple procedure presenting on 9/20/20 for left MCA syndrome (aphasia, dysarthria, right facial weakness, and right sided weakness and numbness) whom neurology was consulted to address acute ischemic stroke. CT head showed no acute abnormalities. CTA head and neck were both normal, with no LVO seen. There were no contraindications for thrombolytic therapy, thus patient was given tPA on 9/20/20 at 19:49. She was not a candidate for IR thrombectomy given no LVO seen on vessel imaging. Patient was enrolled in the FIAT trial and randomized to the research arm. MRI brain wo contrast revealed a small acute infarct at the left frontoparietal junction. Neurological exam today improved since treatment with tPA, NIHSS 3 significant only for right nasolabial fold flattening, mild right pronator drift, and mild expressive aphasia with phonemic error when asked to repeat. Etiology of acute ischemic stroke is likely cardioembolic given TTE with moderately dilated left atrium and normal CTAs of head and neck.     Plan:    -q4h and PRN neuro assessment. VS per nursing/unit protocol. BP goal < 140/90. Antihypertensives per primary team.   - FIAT Enrollment - research arm - NIHSS to be completed prior to discharge.  -Telemetry and pulse oximetry; currently SB with PACs. Screen for Afib/arrhythmia.   -TTE revealed EF 65% and moderate left atrial dilation. These findings are suggestive of possible Afib.  -Outpatient extended cardiac event monitoring for afib (eg. Zio patch vs. Loop)  -Continue  mg PO q day   -Continue Atorvastatin 80 mg PO q HS. Note lipid panel:  (goal <100),  (within goal>40) .   -Recommend aggressive BG management per primary team. Avoid IVF with Dextrose. BG goal 140-180. Note hemoglobin A1c: 5.9 (within goal <6.4).    -PT/OT/SLP eval and treat. See notes for recommendations,  respectively.   -Counseled patient at length regarding life style and risk factor modification for secondary stroke prevention.   -All other medical management per primary team.   -DVT PPX: SCDs and lovenox.   -Follow up outpatient [facility]. Referral placed.     The evaluation of the patient, and recommended management, was discussed with Dr. Mcdonald, Dr. Costa, and FLACO Anguiano RN. I have performed a physical exam and reviewed and updated ROS and Plan today (9/22/2020). In review of yesterday's note (9/21/2020), there are no changes except as documented above.      No further recommendations or further studies from a neurological standpoint at this time. Please re-consult if you have further questions or there is a change in status.      Maynor Hall, HAILEY.P.R.N.   Nurse Practitioner, Neurohospitalist  [facility]  t) [phone] (f) [phone]

## 2020-09-22 NOTE — CARE PLAN
Problem: Acute Care of the Stroke Patient  Goal: Optimal Outcome for the Stroke patient  Intervention: Vital Signs and Neuro Checks per order  Note: Pt. 24hrs post TPA, receiving VS and neuro checks and NIHSS per order, no neuro changes at this time.      Problem: Risk for Impaired Mobility--Activity Intolerance  Goal: Mobilize and/or Transfer Safely with Maximum [place]  Intervention: Progressive Mobilization--ADL Flow Sheet  Note: Pt. Up to commode post 24hrs after TPA.  Tolerated well, SBA, will continue to encourage and provide mobility.

## 2020-09-23 NOTE — DISCHARGE SUMMARY
"Discharge Summary    CHIEF COMPLAINT ON ADMISSION  Chief Complaint   Patient presents with   • Possible Stroke       Reason for Admission  Trouble Swallowing     Admission Date  9/20/2020    CODE STATUS  Prior    HPI & HOSPITAL COURSE  This is a 74 y.o. female here with \"AIS s/p tpa. MRI with small acute infarct left frontoparietal region. On ASA, statin, Lovenox for pharm DVT PPX. PT/OT/SLP following.\" Patient with no acute therapy needs.     Echo with EF 65% and dilated left atrium. No signs of afib on EKG or tele. Likely needs outpatient cardiac monitoring which I discussed with RN. Will be arranged. Patient was transferred from the ICU but in actuality was just discharged home from there.        Therefore, she is discharged in fair and stable condition to home with close outpatient follow-up.    The patient recovered much more quickly than anticipated on admission.    Discharge Date  9/22/2020    FOLLOW UP ITEMS POST DISCHARGE  Follow up cardiology for Ziopatch or similar     DISCHARGE DIAGNOSES  Active Problems:    Acute CVA (cerebrovascular accident) (HCC) POA: Unknown    Right sided weakness POA: Unknown  Resolved Problems:    * No resolved hospital problems. *      FOLLOW UP  Future Appointments   Date Time Provider Department Center   9/25/2020  1:20 PM Brian Carrillo M.D. [facility] None   10/8/2020 10:00 AM [facility] None     Ernie Couch D.O.  [address] [address]  [phone]      [facility]  called office and left a voicemail requesitng office to call to schedule your appointment. If you do not hear from them please call to schedule. Thank you       MEDICATIONS ON DISCHARGE     Medication List      START taking these medications      Instructions   aspirin EC 81 MG Tbec  Commonly known as: ECOTRIN   Take 1 Tab by mouth every day.  Dose: 81 mg     atorvastatin 80 MG tablet  Commonly known as: LIPITOR   Take 1 Tab by mouth every evening.  Dose: 80 mg      "   CONTINUE taking these medications      Instructions   amLODIPine 5 MG Tabs  Commonly known as: NORVASC   Take 5 mg by mouth every morning.  Dose: 5 mg     brimonidine 0.2 % Soln  Commonly known as: ALPHAGAN   Place 1 Drop in right eye 2 Times a Day.  Dose: 1 Drop     timolol 0.5 % Soln  Commonly known as: TIMOPTIC   Place 1 Drop in right eye every morning.  Dose: 1 Drop            Allergies  Allergies   Allergen Reactions   • Percodan-Katherin [Oxycodone-Aspirin]        DIET  No orders of the defined types were placed in this encounter.      ACTIVITY  As tolerated.  Weight bearing as tolerated    CONSULTATIONS  ICU    PROCEDURES  ECHO    LABORATORY  Lab Results   Component Value Date    SODIUM 136 09/21/2020    POTASSIUM 4.2 09/21/2020    CHLORIDE 101 09/21/2020    CO2 23 09/21/2020    GLUCOSE 90 09/21/2020    BUN 11 09/21/2020    CREATININE 0.47 (L) 09/21/2020        Lab Results   Component Value Date    WBC 5.2 09/21/2020    HEMOGLOBIN 11.5 (L) 09/21/2020    HEMATOCRIT 34.8 (L) 09/21/2020    PLATELETCT 231 09/21/2020        Total time of the discharge process exceeds 40 minutes.

## 2020-09-25 ENCOUNTER — OFFICE VISIT (OUTPATIENT)
Dept: CARDIOLOGY | Facility: MEDICAL CENTER | Age: 75
End: 2020-09-25
Payer: MEDICARE

## 2020-09-25 VITALS
HEIGHT: 65 IN | DIASTOLIC BLOOD PRESSURE: 60 MMHG | WEIGHT: 112 LBS | RESPIRATION RATE: 18 BRPM | BODY MASS INDEX: 18.66 KG/M2 | OXYGEN SATURATION: 95 % | HEART RATE: 59 BPM | SYSTOLIC BLOOD PRESSURE: 102 MMHG

## 2020-09-25 DIAGNOSIS — I10 ESSENTIAL (PRIMARY) HYPERTENSION: ICD-10-CM

## 2020-09-25 DIAGNOSIS — I63.9 CEREBROVASCULAR ACCIDENT (CVA), UNSPECIFIED MECHANISM (HCC): ICD-10-CM

## 2020-09-25 DIAGNOSIS — E78.5 HYPERLIPIDEMIA LDL GOAL <70: ICD-10-CM

## 2020-09-25 PROCEDURE — 99204 OFFICE O/P NEW MOD 45 MIN: CPT | Performed by: INTERNAL MEDICINE

## 2020-09-25 RX ORDER — BRIMONIDINE TARTRATE 1 MG/ML
SOLUTION/ DROPS OPHTHALMIC
COMMUNITY
Start: 2020-08-21

## 2020-09-25 SDOH — HEALTH STABILITY: MENTAL HEALTH: HOW MANY STANDARD DRINKS CONTAINING ALCOHOL DO YOU HAVE ON A TYPICAL DAY?: 1 OR 2

## 2020-09-25 SDOH — HEALTH STABILITY: MENTAL HEALTH: HOW OFTEN DO YOU HAVE A DRINK CONTAINING ALCOHOL?: 4 OR MORE TIMES A WEEK

## 2020-09-25 ASSESSMENT — ENCOUNTER SYMPTOMS
DYSPNEA ON EXERTION: 0
BLOATING: 0
NAUSEA: 0
VOMITING: 0
LOSS OF BALANCE: 0
FALLS: 0
PND: 0
DIARRHEA: 0
NUMBNESS: 0
LIGHT-HEADEDNESS: 0
ORTHOPNEA: 0
PALPITATIONS: 0
SLEEP DISTURBANCES DUE TO BREATHING: 0
SHORTNESS OF BREATH: 0
NEAR-SYNCOPE: 0
HEADACHES: 0
DIAPHORESIS: 0
NIGHT SWEATS: 0
IRREGULAR HEARTBEAT: 0
DOUBLE VISION: 0
FEVER: 0
MYALGIAS: 0
BACK PAIN: 0
SYNCOPE: 0
PARESTHESIAS: 0
DIZZINESS: 0
SORE THROAT: 0
CONSTIPATION: 0
COUGH: 0
WEAKNESS: 0
BLURRED VISION: 0
WHEEZING: 0
EXCESSIVE DAYTIME SLEEPINESS: 0
DECREASED APPETITE: 0

## 2020-09-25 ASSESSMENT — FIBROSIS 4 INDEX: FIB4 SCORE: 1.84

## 2020-09-25 NOTE — PROGRESS NOTES
Cardiology Initial Consultation Note    Date of note:    9/25/2020    Primary Care Provider: Ernie Couch D.O.  Referring Provider: Montana Costa M.D.     Patient Name: Char Kimbrough   YOB: 1945  MRN:              [id number]    Chief Complaint   Patient presents with   • New Patient     Acute CVA (cerebrovascular accident) (HCC) per referral       History of Present Illness: Ms. Char Kimbrough is a 74 y.o. female whose current medical problems include recent ischemic stroke status post TPA with small acute infarct in left frontoparietal region, hyperlipidemia and hypertension who is here for cardiac consultation for heart monitor options to rule out underlying arrhythmias.    Patient was admitted at [facility] from 9/20/2022 9/22/2020 for stroke.  During hospital stay, she underwent echocardiogram which showed normal left ventricular ejection fraction 65% and dilated left atrium.  Telemetry monitoring failed to show any atrial arrhythmias.  Since discharge, she reports feeling excellent without any concerning symptoms.    She denies any symptoms of chest pain, chest discomfort, lightheadedness or dizziness, dyspnea on exertion, orthopnea or lower extremity swelling.  Does report an isolated event about 2 weeks ago of rapid heartbeat which lasted for few seconds to minutes and resolve spontaneously.  Denies any other associated symptoms with it.  She is currently on aspirin 81 mg and Lipitor 80 mg daily which she is tolerating quite well.      Cardiovascular Risk Factors:  1. Smoking status: Never smoker  2. Type II Diabetes Mellitus:    Lab Results   Component Value Date/Time    HBA1C 5.9 (H) 09/20/2020 10:10 PM    HBA1C 5.6 02/16/2016 07:27 AM     3. Hypertension: Yes, well controlled  4. Dyslipidemia:    Cholesterol,Tot   Date Value Ref Range Status   09/20/2020 223 (H) 100 - 199 mg/dL Final     LDL   Date Value Ref Range Status   09/20/2020 104 (H) <100 mg/dL Final     HDL   Date Value Ref  Range Status   09/20/2020 107 >=40 mg/dL Final     Triglycerides   Date Value Ref Range Status   09/20/2020 60 0 - 149 mg/dL Final     5. Family history of early Coronary Artery Disease in a first degree relative (Male less than 55 years of age; Female less than 65 years of age): Denies  6.  Obesity and/or Metabolic Syndrome: No  7. Sedentary lifestyle: No    Review of Systems   Constitution: Negative for decreased appetite, diaphoresis, fever, malaise/fatigue and night sweats.   HENT: Negative for congestion and sore throat.    Eyes: Negative for blurred vision and double vision.   Cardiovascular: Negative for chest pain, cyanosis, dyspnea on exertion, irregular heartbeat, leg swelling, near-syncope, orthopnea, palpitations, paroxysmal nocturnal dyspnea and syncope.   Respiratory: Negative for cough, shortness of breath, sleep disturbances due to breathing and wheezing.    Endocrine: Negative for cold intolerance and heat intolerance.   Musculoskeletal: Negative for back pain, falls and myalgias.   Gastrointestinal: Negative for bloating, constipation, diarrhea, nausea and vomiting.   Neurological: Negative for excessive daytime sleepiness, dizziness, headaches, light-headedness, loss of balance, numbness, paresthesias and weakness.         Past Medical History:   Diagnosis Date   • Pancreatic cancer (HCC)          Past Surgical History:   Procedure Laterality Date   • WHIPPLE PROCEDURE           Current Outpatient Medications   Medication Sig Dispense Refill   • atorvastatin (LIPITOR) 80 MG tablet Take 1 Tab by mouth every evening. 30 Tab 1   • aspirin EC (ECOTRIN) 81 MG Tablet Delayed Response Take 1 Tab by mouth every day. 30 Tab 1   • timolol (TIMOPTIC) 0.5 % Solution Place 1 Drop in right eye every morning.     • brimonidine (ALPHAGAN) 0.2 % Solution Place 1 Drop in right eye 2 Times a Day. 0.3% per patient     • amLODIPine (NORVASC) 5 MG Tab Take 5 mg by mouth every morning.     • ALPHAGAN P 0.1 % Solution  "INSTILL 1 DROP INTO BOTH EYES TWICE A DAY       No current facility-administered medications for this visit.          Allergies   Allergen Reactions   • Percodan-Katherin [Oxycodone-Aspirin]    • Hydrocodone Vomiting   • Oxycodone-Acetaminophen          History reviewed. No pertinent family history.      Social History     Socioeconomic History   • Marital status: Single     Spouse name: Not on file   • Number of children: Not on file   • Years of education: Not on file   • Highest education level: Not on file   Occupational History   • Not on file   Social Needs   • Financial resource strain: Not on file   • Food insecurity     Worry: Not on file     Inability: Not on file   • Transportation needs     Medical: Not on file     Non-medical: Not on file   Tobacco Use   • Smoking status: Never Smoker   • Smokeless tobacco: Never Used   Substance and Sexual Activity   • Alcohol use: Yes     Alcohol/week: 1.2 oz     Types: 2 Glasses of wine per week     Frequency: 4 or more times a week     Drinks per session: 1 or 2   • Drug use: Never   • Sexual activity: Not on file   Lifestyle   • Physical activity     Days per week: Not on file     Minutes per session: Not on file   • Stress: Not on file   Relationships   • Social connections     Talks on phone: Not on file     Gets together: Not on file     Attends [employer]ism service: Not on file     Active member of club or organization: Not on file     Attends meetings of clubs or organizations: Not on file     Relationship status: Not on file   • Intimate partner violence     Fear of current or ex partner: Not on file     Emotionally abused: Not on file     Physically abused: Not on file     Forced sexual activity: Not on file   Other Topics Concern   • Not on file   Social History Narrative   • Not on file         Physical Exam:  Ambulatory Vitals  /60 (BP Location: Left arm, Patient Position: Sitting, BP Cuff Size: Adult)   Pulse (!) 59   Resp 18   Ht 1.651 m (5' 5\")   Wt " 50.8 kg (112 lb)   SpO2 95%    Oxygen Therapy:  Pulse Oximetry: 95 %  BP Readings from Last 4 Encounters:   09/25/20 102/60   09/22/20 135/63       Weight/BMI: Body mass index is 18.64 kg/m².  Wt Readings from Last 4 Encounters:   09/25/20 50.8 kg (112 lb)   09/20/20 52.1 kg (114 lb 13.8 oz)         General: Well appearing and in no apparent distress  Eyes: nl conjunctiva, no icteric sclera  ENT: wearing a mask, normal external appearance of ears  Neck: no visible JVP,  no carotid bruits  Lungs: normal respiratory effort, CTAB  Heart: RRR, no murmurs, no rubs or gallops,  no edema bilateral lower extremities. No LV/RV heave on cardiac palpatation. + bilateral radial pulses.  + bilateral dp pulses.   Abdomen: soft, non tender, non distended, no masses, normal bowel sounds.  No HSM.  Extremities/MSK: no clubbing, no cyanosis  Neurological: No focal sensory deficits  Psychiatric: Appropriate affect, A/O x 3, intact judgement and insight  Skin: Warm extremities      Lab Data Review:  Lab Results   Component Value Date/Time    CHOLSTRLTOT 223 (H) 09/20/2020 10:10 PM     (H) 09/20/2020 10:10 PM     09/20/2020 10:10 PM    TRIGLYCERIDE 60 09/20/2020 10:10 PM       Lab Results   Component Value Date/Time    SODIUM 136 09/21/2020 03:30 AM    POTASSIUM 4.2 09/21/2020 03:30 AM    CHLORIDE 101 09/21/2020 03:30 AM    CO2 23 09/21/2020 03:30 AM    GLUCOSE 90 09/21/2020 03:30 AM    BUN 11 09/21/2020 03:30 AM    CREATININE 0.47 (L) 09/21/2020 03:30 AM     Lab Results   Component Value Date/Time    ALKPHOSPHAT 95 09/20/2020 07:31 PM    ASTSGOT 25 09/20/2020 07:31 PM    ALTSGPT 20 09/20/2020 07:31 PM    TBILIRUBIN 0.5 09/20/2020 07:31 PM      Lab Results   Component Value Date/Time    WBC 5.2 09/21/2020 03:30 AM     Lab Results   Component Value Date/Time    HBA1C 5.9 (H) 09/20/2020 10:10 PM    HBA1C 5.6 02/16/2016 07:27 AM         Cardiac Imaging and Procedures Review:    EKG dated 9/20/2020: My personal interpretation  is Normal sinus rhythm, normal ECG.    Echo dated 9/21/2020:   CONCLUSIONS  Normal left ventricular size, wall thickness, and systolic function.  The right ventricle was normal in size and function.  Moderately dilated left atrium.  Mild mitral regurgitation.  Mild aortic insufficiency.  Estimated right ventricular systolic pressure  is 25 mmHg; normal.  Normal pericardium without effusion.     Compared to prior echo in 2014, aortic and tricuspid regurgitations   improved to mild.      Radiology test Review:  CT angiogram of the head and neck with and without contrast 9/20/2020:   IMPRESSION:  CT angiogram of the neck within normal limits.        Assessment & Plan     1. Cerebrovascular accident (CVA), unspecified mechanism (HCC)  Cardiac Event Monitor   2. Hyperlipidemia LDL goal <70     3. Essential (primary) hypertension           Shared Medical Decision Making:  Had an extensive discussion with the patient and his son regarding options to monitor presence of any underlying atrial arrhythmias that could have been a precipitating factor for recent stroke.  After discussion and shared decision-making, will obtain a 30-day event monitor.  Discussed future plan of initiating oral anticoagulation therapy if underlying atrial arrhythmia is detected to which the patient and her son voices understanding.    If no underlying atrial arrhythmias are detected with 30-day monitoring, discussed the option of further implanting a loop recorder.    Continue aspirin 81 mg and atorvastatin 80 mg daily.    Continue amlodipine 5 mg daily as her blood pressure is in excellent control.    All of patient's and her son's excellent questions were answered to the best of my knowledge and to their satisfaction.  I will be in contact with them once the event monitor results have been obtained.  It was a pleasure seeing Ms. Char Kimbrough in my clinic today. Return in about 1 year or earlier as needed.  Patient is aware to call the cardiology  clinic with any questions or concerns.      Brian Carrillo MD  [facility]  [facility], [address]  [address] [address]  [place] [address]  Phone: [phone]  Fax: [phone]

## 2020-10-02 NOTE — PROGRESS NOTES
Chief Complaint   Patient presents with   • New Patient     stroke       Problem List Items Addressed This Visit     Hyperlipidemia LDL goal <70    Essential (primary) hypertension      Other Visit Diagnoses     Ischemic stroke (HCC)        Screening for depression        Altered glucose metabolism        Hospital discharge follow-up              History of present illness:  Char Kimbrough 74 y.o. female presents today for [facility].  PMHx: HTN, HLD, history of pancreatic cancer    Patient presented to hospital 9/20/202o for c/o aphasia, dysarthria, right facial weakness, right-sided weakness and numbness.  She was given TPA.  No thrombectomy.  Pt reports after 2 hours since TPA she was back to normal. She is currently on high-dose aspirin and Lipitor. No side effects. Compliant.     Not smoking cigarettes. Drinks alcohol 2x a week. No recreational drug use.     She has seen PCP already. She is working on seeing a cardiology.      Mood is good. No suicidal or homicidal thoughts.       Past medical history:   Past Medical History:   Diagnosis Date   • Pancreatic cancer (HCC)        Past surgical history:   Past Surgical History:   Procedure Laterality Date   • WHIPPLE PROCEDURE         Family history:   No family history on file.    Social history:   Social History     Socioeconomic History   • Marital status: Single     Spouse name: Not on file   • Number of children: Not on file   • Years of education: Not on file   • Highest education level: Not on file   Occupational History   • Not on file   Social Needs   • Financial resource strain: Not on file   • Food insecurity     Worry: Not on file     Inability: Not on file   • Transportation needs     Medical: Not on file     Non-medical: Not on file   Tobacco Use   • Smoking status: Never Smoker   • Smokeless tobacco: Never Used   Substance and Sexual Activity   • Alcohol use: Yes     Alcohol/week: 1.2 oz     Types: 2 Glasses of wine per week     Frequency:  4 or more times a week     Drinks per session: 1 or 2   • Drug use: Never   • Sexual activity: Not on file   Lifestyle   • Physical activity     Days per week: Not on file     Minutes per session: Not on file   • Stress: Not on file   Relationships   • Social connections     Talks on phone: Not on file     Gets together: Not on file     Attends Faith service: Not on file     Active member of club or organization: Not on file     Attends meetings of clubs or organizations: Not on file     Relationship status: Not on file   • Intimate partner violence     Fear of current or ex partner: Not on file     Emotionally abused: Not on file     Physically abused: Not on file     Forced sexual activity: Not on file   Other Topics Concern   • Not on file   Social History Narrative   • Not on file       Current medications:   Current Outpatient Medications   Medication   • ALPHAGAN P 0.1 % Solution   • atorvastatin (LIPITOR) 80 MG tablet   • aspirin EC (ECOTRIN) 81 MG Tablet Delayed Response   • timolol (TIMOPTIC) 0.5 % Solution   • brimonidine (ALPHAGAN) 0.2 % Solution   • amLODIPine (NORVASC) 5 MG Tab     No current facility-administered medications for this visit.        Medication Allergy:  Allergies   Allergen Reactions   • Percodan-Katherin [Oxycodone-Aspirin]    • Hydrocodone Vomiting   • Oxycodone-Acetaminophen        Review of systems:   General: Denies fevers or chills, or nightsweats, or generalized fatigue.    Head: Denies headaches or dizziness or lightheadedness  EENT: Denies vision changes, vision loss or pain, nasal secretion, nasal bleeding, difficulty swallowing, hearing loss, tinnitus, vertigo, ear pain  Respiratory: Denies shortness of breath, cough, sputum, or wheezing  Cardiac: Denies chest pain, palpitations, edema or syncope  Gastrointestinal: Denies nausea, vomiting, no abdominal pain or change in bowel habits, no melena or hematochezia  Urinary: Denies dysuria, frequency, hesitancy, or  "incontinence.  Dermatologic:  Denies new rash  Musculoskeletal: Denies muscle pain or swelling, no atrophy, no neck and back pain or stiffness.   Neurologic: Denies facial droopiness,  paresthesias, ataxia, memory loss, abnormal movements, seizures, loss of consciousness, or episodes of confusion. + r sided weakness, change in speech or language  Psychiatric: Denies anxiety, depression, mood swings, suicidal or homicidal thoughts       Physical examination:   Vitals:    10/08/20 1009   BP: 128/62   BP Location: Right arm   Patient Position: Sitting   BP Cuff Size: Adult   Pulse: 68   Resp: 14   Temp: (!) 35.8 °C (96.4 °F)   TempSrc: Temporal   SpO2: 96%   Weight: 50.6 kg (111 lb 8.8 oz)   Height: 1.664 m (5' 5.5\")     General: Patient in no acute distress, pleasant and cooperative.  HEENT: Normocephalic, no signs of acute trauma.   moist conjunctivae. Nares are patent. Oropharynx clear without lesions and normal  hard and soft palates.   Neck: Supple, No carotid bruits. There is normal range of motion.   Resp: clear to auscultation bilaterally. No wheezes or crackles.   CV: RRR, no murmurs.   Skin: no signs of acute rashes or trauma.   Musculoskeletal: joints exhibit full range of motion, without any pain to palpation. There are no signs of joint or muscle swelling. There is no tenderness to deep palpation of muscles.   Psychiatric: No hallucinatory behavior. No symptoms of depression or suicidal ideation. Mood and affect appear normal on exam.     NEUROLOGICAL EXAM:   Mental status, orientation: Awake, alert and fully oriented.   Speech and language: speech is clear and fluent. The patient is able to name, repeat and comprehend.   Memory: There is intact recollection of recent and remote events.   Cranial nerve exam:   CN I: Not examined   CN II: PERRL.  CN III, IV, VI: EOMI; no nystagmus   CN V: Facial sensation intact bilaterally   CN VII: face symmetric   CN VIII: hearing intact to finger rub bilaterally   CN " IX, X: palate elevates symmetrically   CN XI: Symmetric shoulder shrug  CN XII: tongue midline. No signs of tongue biting or fasciculations   Motor exam: Strength is 5/5 in all extremities. Tone is normal. No abnormal movements were seen on exam.   Sensory exam reveals normal sense of light touch in all extremities.   Deep tendon reflexes:  absent ankle jerks, 2+ throughout. Plantar responses are flexor. There is no clonus.   Coordination: shows a normal finger-nose-finger. Normal rapidly alternating movements.   Gait: The patient was able to get up from seated position on first attempt without requiring assistance. Found to be steady when walking. Movements were fluid with normal arm swing. The patient was able to turn without difficulties or tendency to fall. Romberg exam unremarkable.      ANCILLARY DATA REVIEWED:     Lab Data Review:  Reviewed in chart. CBC, CMP, Lipid, HgA1c     Records reviewed:  Reviewed in chart.    Imaging:  MRI brain 9/20/2020  1.  Small sized acute infarct in the left frontoparietal junction. No evidence of hemorrhagic transformation.  2.  Mild diffuse cerebral substance loss.  3.  Mild microangiopathic ischemic change versus demyelination or gliosis.    Echo 9/20/2020  Normal left ventricular size, wall thickness, and systolic function.  The right ventricle was normal in size and function.  Moderately dilated left atrium.  Mild mitral regurgitation.  Mild aortic insufficiency.  Estimated right ventricular systolic pressure  is 25 mmHg; normal.  Normal pericardium without effusion.     Compared to prior echo in 2014, aortic and tricuspid regurgitations   improved to mild.    CTA neck 9/20/2020  CT angiogram of the neck within normal limits.    CTA head 9/20/2020  CT angiogram of the [place] within normal limits.    ASSESSMENT AND PLAN:    1. Ischemic stroke (HCC)    2. Screening for depression    3. Altered glucose metabolism    4. Hyperlipidemia LDL goal <70    5. Essential  (primary) hypertension    6. Hospital discharge follow-up      NIH Stroke Scale:     1a. Level of Consciousness (Alert, drowsy, etc): 0= Alert     1b. LOC Questions (Month, age): 0= Answers both correctly     1c. LOC Commands (Open/close eyes make fist/let go): 0= Obeys both correctly     2.   Best Gaze (Eyes open - patient follows examiner's finger on face): 0= Normal     3.   Visual Fields (introduce visual stimulus/threat to patient's field quadrants): 0= No visual loss    4.   Facial Paresis (Show teeth, raise eyebrows and squeeze eyes shut): 0= Normal            5a. Motor Arm - Left (Elevate arm to 90 degrees if patient is sitting, 45 degrees if  supine): 0= No drift     5b. Motor Arm - Right (Elevate arm to 90 degrees if patient is sitting, 45 degrees if supine): 0= No drift     6a. Motor Leg - Left (Elevate leg 30 degrees with patient supine): 0= No drift     6b. Motor Leg - Right  (Elevate leg 30 degrees with patient supine): 0= No drift     7.   Limb Ataxia (Finger-nose, heel down shin): 0= No ataxia     8.   Sensory (Pin prick to face, arm, trunk and leg - compare side to side): 0= Normal     9.  Best Language (Name item, describe a picture and read sentences): 0= No aphasia     10. Dysarthria (Evaluate speech clarity by patient repeating listed words): 0= Normal articulation     11. Extinction and Inattention (Use information from prior testing to identify neglect or  double simultaneous stimuli testing): 0= No neglect     Total NIH Score: 0        Current mRS: 0        CLINICAL DECISION:  Ischemic stroke in the left frontoparietal area was likely cardioembolic in nature given moderately dilated left atrium and left atrial volume index 45. LVEF 65%.  CTA neck and head are unremarkable.  Patient was given TPA and was not a candidate for thrombectomy. She is doing well with no residual deficit. No hx of smoking. Discussed other vascular risk factors including altered glucose metabolism (5.9), HLD (104) and  HTN.     Plan:  -Continue current medications: ASA and lipitor. May need anticoagulation if there is evidence of afib. Will defer to cardiology.     -Education given to help control risk factors including:  /80, LDL 70  or less, HBA1C 7 or less.     -Needs close fu with PCP for optimal control of risk factors.     -Discussed lifestyle modification including diet and exercise.  Limiting intake of sugar and carbohydrates.    -Continue f/u with cardiology for loop recorder or similar to monitor for paroxysmal afib (PAF).          FOLLOW-UP:   Return in about 2 months (around 12/8/2020).        EDUCATION AND COUNSELING:  -Usefulness of anti-platelets in secondary stroke prevention was discussed. The side effects, including increased risk for bleeding (both traumatic and spontaneous) were reviewed with the patient at length.   -Recommended goal for LDL is 70 or less. Side effects of statin, including idiosyncratic reactions as well as more common myalgias, and liver injury  were discussed. Monitoring of LFT’s will be deferred to the patient’s primary care physician.   -Secondary stroke prevention education was provided, including; lifestyle modification with healthy diet, and exercise, as well as recommendations for optimal control of risk factors.   -Close follow up with PCP is recommended.   -Compliance with medications was discussed in detail.   -Smoking cessation education was provided for greater than 3 minutes.   -The patient/family was educated on recognition of stroke symptoms. The patient/family instructed to call 911 EMERGENTLY upon presentation of any of these symptoms/signs, to be taken to nearest emergency department for evaluation and acute care.       The patient understands and agrees that due to the complexity of his/her diagnosis, results of any testing and further recommendations will typically be discussed/made during a face to face encounter in my office. The patient and/or family further  understands it is their responsibility to keep proper follow up.         Valery Cuevas, MSN, APRN, FNP-C  [facility]  Office: [phone]  Fax: [phone]

## 2020-10-08 ENCOUNTER — OFFICE VISIT (OUTPATIENT)
Dept: NEUROLOGY | Facility: MEDICAL CENTER | Age: 75
End: 2020-10-08
Payer: MEDICARE

## 2020-10-08 VITALS
HEIGHT: 66 IN | BODY MASS INDEX: 17.93 KG/M2 | HEART RATE: 68 BPM | WEIGHT: 111.55 LBS | TEMPERATURE: 96.4 F | RESPIRATION RATE: 14 BRPM | DIASTOLIC BLOOD PRESSURE: 62 MMHG | SYSTOLIC BLOOD PRESSURE: 128 MMHG | OXYGEN SATURATION: 96 %

## 2020-10-08 DIAGNOSIS — R73.09 ALTERED GLUCOSE METABOLISM: ICD-10-CM

## 2020-10-08 DIAGNOSIS — I10 ESSENTIAL (PRIMARY) HYPERTENSION: ICD-10-CM

## 2020-10-08 DIAGNOSIS — Z13.31 SCREENING FOR DEPRESSION: ICD-10-CM

## 2020-10-08 DIAGNOSIS — E78.5 HYPERLIPIDEMIA LDL GOAL <70: ICD-10-CM

## 2020-10-08 DIAGNOSIS — I63.9 ISCHEMIC STROKE (HCC): ICD-10-CM

## 2020-10-08 DIAGNOSIS — Z09 HOSPITAL DISCHARGE FOLLOW-UP: ICD-10-CM

## 2020-10-08 PROCEDURE — 99215 OFFICE O/P EST HI 40 MIN: CPT | Performed by: NURSE PRACTITIONER

## 2020-10-08 ASSESSMENT — FIBROSIS 4 INDEX: FIB4 SCORE: 1.84

## 2020-10-08 ASSESSMENT — PATIENT HEALTH QUESTIONNAIRE - PHQ9: CLINICAL INTERPRETATION OF PHQ2 SCORE: 0

## 2021-01-14 DIAGNOSIS — Z23 NEED FOR VACCINATION: ICD-10-CM

## 2021-03-31 ENCOUNTER — IMMUNIZATION (OUTPATIENT)
Dept: FAMILY PLANNING/WOMEN'S HEALTH CLINIC | Facility: IMMUNIZATION CENTER | Age: 76
End: 2021-03-31
Attending: INTERNAL MEDICINE
Payer: MEDICARE

## 2021-03-31 DIAGNOSIS — Z23 ENCOUNTER FOR VACCINATION: Primary | ICD-10-CM

## 2021-03-31 DIAGNOSIS — Z23 NEED FOR VACCINATION: ICD-10-CM

## 2021-03-31 PROCEDURE — 91300 PFIZER SARS-COV-2 VACCINE: CPT

## 2021-03-31 PROCEDURE — 0001A PFIZER SARS-COV-2 VACCINE: CPT

## 2021-04-22 ENCOUNTER — IMMUNIZATION (OUTPATIENT)
Dept: FAMILY PLANNING/WOMEN'S HEALTH CLINIC | Facility: IMMUNIZATION CENTER | Age: 76
End: 2021-04-22
Payer: MEDICARE

## 2021-04-22 DIAGNOSIS — Z23 ENCOUNTER FOR VACCINATION: Primary | ICD-10-CM

## 2021-04-22 PROCEDURE — 91300 PFIZER SARS-COV-2 VACCINE: CPT | Performed by: INTERNAL MEDICINE

## 2021-04-22 PROCEDURE — 0002A PFIZER SARS-COV-2 VACCINE: CPT | Performed by: INTERNAL MEDICINE

## 2021-05-20 ENCOUNTER — HOSPITAL ENCOUNTER (OUTPATIENT)
Dept: RADIOLOGY | Facility: MEDICAL CENTER | Age: 76
End: 2021-05-20
Attending: EMERGENCY MEDICINE
Payer: MEDICARE

## 2021-05-20 DIAGNOSIS — S99.929S: ICD-10-CM

## 2021-05-20 DIAGNOSIS — S99.919S: ICD-10-CM

## 2021-05-20 DIAGNOSIS — S89.90XS: ICD-10-CM

## 2021-05-20 PROCEDURE — 73590 X-RAY EXAM OF LOWER LEG: CPT | Mod: LT

## 2024-11-21 NOTE — CARE PLAN
Problem: Safety  Goal: Will remain free from injury  Outcome: PROGRESSING AS EXPECTED  Goal: Will remain free from falls  Outcome: PROGRESSING AS EXPECTED      Detail Level: Simple Additional Text: 1 skin tag treated via eletrocautery, patient tolerated well, wound care instructions provided and reviewed

## 2025-07-31 ENCOUNTER — APPOINTMENT (OUTPATIENT)
Dept: ADMISSIONS | Facility: MEDICAL CENTER | Age: 80
End: 2025-07-31
Attending: SURGERY
Payer: MEDICARE

## 2025-08-04 ENCOUNTER — PRE-ADMISSION TESTING (OUTPATIENT)
Dept: ADMISSIONS | Facility: MEDICAL CENTER | Age: 80
End: 2025-08-04
Attending: SURGERY
Payer: MEDICARE

## 2025-08-04 RX ORDER — CLOPIDOGREL BISULFATE 75 MG/1
75 TABLET ORAL
COMMUNITY

## 2025-08-07 RX ORDER — PANCRELIPASE LIPASE, PANCRELIPASE PROTEASE, PANCRELIPASE AMYLASE 252600; 60000; 189600 [USP'U]/1; [USP'U]/1; [USP'U]/1
CAPSULE, DELAYED RELEASE ORAL
COMMUNITY

## 2025-08-11 ENCOUNTER — PRE-ADMISSION TESTING (OUTPATIENT)
Dept: ADMISSIONS | Facility: MEDICAL CENTER | Age: 80
End: 2025-08-11
Attending: SURGERY
Payer: MEDICARE

## 2025-08-11 DIAGNOSIS — Z01.812 PRE-OPERATIVE LABORATORY EXAMINATION: ICD-10-CM

## 2025-08-11 DIAGNOSIS — Z01.810 PRE-OPERATIVE CARDIOVASCULAR EXAMINATION: Primary | ICD-10-CM

## 2025-08-11 LAB
ALBUMIN SERPL BCP-MCNC: 4.3 G/DL (ref 3.2–4.9)
ALBUMIN/GLOB SERPL: 1.7 G/DL
ALP SERPL-CCNC: 89 U/L (ref 30–99)
ALT SERPL-CCNC: 21 U/L (ref 2–50)
ANION GAP SERPL CALC-SCNC: 11 MMOL/L (ref 7–16)
AST SERPL-CCNC: 65 U/L (ref 12–45)
BILIRUB SERPL-MCNC: 0.6 MG/DL (ref 0.1–1.5)
BUN SERPL-MCNC: 12 MG/DL (ref 8–22)
CALCIUM ALBUM COR SERPL-MCNC: 9 MG/DL (ref 8.5–10.5)
CALCIUM SERPL-MCNC: 9.2 MG/DL (ref 8.5–10.5)
CHLORIDE SERPL-SCNC: 98 MMOL/L (ref 96–112)
CO2 SERPL-SCNC: 22 MMOL/L (ref 20–33)
CREAT SERPL-MCNC: 0.67 MG/DL (ref 0.5–1.4)
EKG IMPRESSION: NORMAL
ERYTHROCYTE [DISTWIDTH] IN BLOOD BY AUTOMATED COUNT: 62.4 FL (ref 35.9–50)
GFR SERPLBLD CREATININE-BSD FMLA CKD-EPI: 87 ML/MIN/1.73 M 2
GLOBULIN SER CALC-MCNC: 2.6 G/DL (ref 1.9–3.5)
GLUCOSE SERPL-MCNC: 98 MG/DL (ref 65–99)
HCT VFR BLD AUTO: 34.7 % (ref 37–47)
HGB BLD-MCNC: 11.1 G/DL (ref 12–16)
MCH RBC QN AUTO: 31.5 PG (ref 27–33)
MCHC RBC AUTO-ENTMCNC: 32 G/DL (ref 32.2–35.5)
MCV RBC AUTO: 98.6 FL (ref 81.4–97.8)
PLATELET # BLD AUTO: 550 K/UL (ref 164–446)
PMV BLD AUTO: 11.9 FL (ref 9–12.9)
POTASSIUM SERPL-SCNC: 4.9 MMOL/L (ref 3.6–5.5)
PROT SERPL-MCNC: 6.9 G/DL (ref 6–8.2)
RBC # BLD AUTO: 3.52 M/UL (ref 4.2–5.4)
SODIUM SERPL-SCNC: 131 MMOL/L (ref 135–145)
WBC # BLD AUTO: 6.9 K/UL (ref 4.8–10.8)

## 2025-08-11 PROCEDURE — 93010 ELECTROCARDIOGRAM REPORT: CPT | Performed by: STUDENT IN AN ORGANIZED HEALTH CARE EDUCATION/TRAINING PROGRAM

## 2025-08-11 PROCEDURE — 85027 COMPLETE CBC AUTOMATED: CPT

## 2025-08-11 PROCEDURE — 80053 COMPREHEN METABOLIC PANEL: CPT

## 2025-08-11 PROCEDURE — 93005 ELECTROCARDIOGRAM TRACING: CPT | Mod: TC

## 2025-08-11 PROCEDURE — 36415 COLL VENOUS BLD VENIPUNCTURE: CPT

## 2025-08-14 ENCOUNTER — ANESTHESIA (OUTPATIENT)
Dept: SURGERY | Facility: MEDICAL CENTER | Age: 80
End: 2025-08-14
Payer: MEDICARE

## 2025-08-14 ENCOUNTER — ANESTHESIA EVENT (OUTPATIENT)
Dept: SURGERY | Facility: MEDICAL CENTER | Age: 80
End: 2025-08-14
Payer: MEDICARE

## 2025-08-14 ENCOUNTER — HOSPITAL ENCOUNTER (OUTPATIENT)
Facility: MEDICAL CENTER | Age: 80
End: 2025-08-15
Attending: SURGERY | Admitting: SURGERY
Payer: MEDICARE

## 2025-08-14 ENCOUNTER — APPOINTMENT (OUTPATIENT)
Dept: RADIOLOGY | Facility: MEDICAL CENTER | Age: 80
End: 2025-08-14
Attending: ANESTHESIOLOGY
Payer: MEDICARE

## 2025-08-14 DIAGNOSIS — G89.18 POST-OPERATIVE PAIN: Primary | ICD-10-CM

## 2025-08-14 LAB
ABO GROUP BLD: NORMAL
ANION GAP SERPL CALC-SCNC: 9 MMOL/L (ref 7–16)
BARCODED ABORH UBTYP: 2800
BARCODED PRD CODE UBPRD: NORMAL
BARCODED UNIT NUM UBUNT: NORMAL
BLD GP AB SCN SERPL QL: NORMAL
BUN SERPL-MCNC: 8 MG/DL (ref 8–22)
CALCIUM SERPL-MCNC: 8 MG/DL (ref 8.5–10.5)
CHLORIDE SERPL-SCNC: 104 MMOL/L (ref 96–112)
CO2 SERPL-SCNC: 23 MMOL/L (ref 20–33)
COMPONENT R 8504R: NORMAL
CREAT SERPL-MCNC: 0.62 MG/DL (ref 0.5–1.4)
GFR SERPLBLD CREATININE-BSD FMLA CKD-EPI: 89 ML/MIN/1.73 M 2
GLUCOSE BLD STRIP.AUTO-MCNC: 136 MG/DL (ref 65–99)
GLUCOSE SERPL-MCNC: 116 MG/DL (ref 65–99)
HCT VFR BLD AUTO: 23.1 % (ref 37–47)
HGB BLD-MCNC: 7.4 G/DL (ref 12–16)
MAGNESIUM SERPL-MCNC: 1.8 MG/DL (ref 1.5–2.5)
POTASSIUM SERPL-SCNC: 4.3 MMOL/L (ref 3.6–5.5)
PRODUCT TYPE UPROD: NORMAL
RH BLD: NORMAL
SODIUM SERPL-SCNC: 136 MMOL/L (ref 135–145)
UNIT STATUS USTAT: NORMAL

## 2025-08-14 PROCEDURE — 160195 HCHG PACU COMPLEX - 1ST 60 MINS: Performed by: SURGERY

## 2025-08-14 PROCEDURE — 160002 HCHG RECOVERY MINUTES (STAT): Performed by: SURGERY

## 2025-08-14 PROCEDURE — 36430 TRANSFUSION BLD/BLD COMPNT: CPT

## 2025-08-14 PROCEDURE — 160048 HCHG OR STATISTICAL LEVEL 1-5: Performed by: SURGERY

## 2025-08-14 PROCEDURE — 700111 HCHG RX REV CODE 636 W/ 250 OVERRIDE (IP): Performed by: ANESTHESIOLOGY

## 2025-08-14 PROCEDURE — 700105 HCHG RX REV CODE 258: Performed by: SURGERY

## 2025-08-14 PROCEDURE — 160196 HCHG PACU COMPLEX - EA ADDL 30 MINS: Performed by: SURGERY

## 2025-08-14 PROCEDURE — 700101 HCHG RX REV CODE 250: Performed by: ANESTHESIOLOGY

## 2025-08-14 PROCEDURE — 80048 BASIC METABOLIC PNL TOTAL CA: CPT

## 2025-08-14 PROCEDURE — 700111 HCHG RX REV CODE 636 W/ 250 OVERRIDE (IP): Mod: JZ | Performed by: ANESTHESIOLOGY

## 2025-08-14 PROCEDURE — 160015 HCHG STAT PREOP MINUTES: Performed by: SURGERY

## 2025-08-14 PROCEDURE — 85014 HEMATOCRIT: CPT

## 2025-08-14 PROCEDURE — 86901 BLOOD TYPING SEROLOGIC RH(D): CPT

## 2025-08-14 PROCEDURE — 76705 ECHO EXAM OF ABDOMEN: CPT

## 2025-08-14 PROCEDURE — 700101 HCHG RX REV CODE 250: Performed by: SURGERY

## 2025-08-14 PROCEDURE — 770030 HCHG ROOM/CARE - EXTENDED RECOVERY EACH 15 MIN

## 2025-08-14 PROCEDURE — 160192 HCHG ANESTHESIA COMPLEX: Performed by: SURGERY

## 2025-08-14 PROCEDURE — 86923 COMPATIBILITY TEST ELECTRIC: CPT

## 2025-08-14 PROCEDURE — 86900 BLOOD TYPING SEROLOGIC ABO: CPT

## 2025-08-14 PROCEDURE — 83735 ASSAY OF MAGNESIUM: CPT

## 2025-08-14 PROCEDURE — A9270 NON-COVERED ITEM OR SERVICE: HCPCS | Performed by: ANESTHESIOLOGY

## 2025-08-14 PROCEDURE — 700105 HCHG RX REV CODE 258: Performed by: ANESTHESIOLOGY

## 2025-08-14 PROCEDURE — 82962 GLUCOSE BLOOD TEST: CPT | Performed by: SURGERY

## 2025-08-14 PROCEDURE — 86850 RBC ANTIBODY SCREEN: CPT

## 2025-08-14 PROCEDURE — 160042 HCHG SURGERY MINUTES - EA ADDL 1 MIN LEVEL 5: Performed by: SURGERY

## 2025-08-14 PROCEDURE — 160193 HCHG PACU STANDARD - 1ST 60 MINS: Performed by: SURGERY

## 2025-08-14 PROCEDURE — 160031 HCHG SURGERY MINUTES - 1ST 30 MINS LEVEL 5: Performed by: SURGERY

## 2025-08-14 PROCEDURE — 502714 HCHG ROBOTIC SURGERY SERVICES: Performed by: SURGERY

## 2025-08-14 PROCEDURE — 64486 TAP BLOCK UNIL BY INJECTION: CPT | Performed by: SURGERY

## 2025-08-14 PROCEDURE — P9016 RBC LEUKOCYTES REDUCED: HCPCS

## 2025-08-14 PROCEDURE — 93005 ELECTROCARDIOGRAM TRACING: CPT | Mod: TC | Performed by: SURGERY

## 2025-08-14 PROCEDURE — C1781 MESH (IMPLANTABLE): HCPCS | Performed by: SURGERY

## 2025-08-14 PROCEDURE — 700102 HCHG RX REV CODE 250 W/ 637 OVERRIDE(OP): Performed by: ANESTHESIOLOGY

## 2025-08-14 PROCEDURE — 85018 HEMOGLOBIN: CPT

## 2025-08-14 PROCEDURE — 700101 HCHG RX REV CODE 250

## 2025-08-14 DEVICE — MESH VENTRALIGHT ST 4.5IN 1EA/CA: Type: IMPLANTABLE DEVICE | Status: FUNCTIONAL

## 2025-08-14 RX ORDER — EPHEDRINE SULFATE 50 MG/ML
50 INJECTION, SOLUTION INTRAVENOUS ONCE
Status: DISCONTINUED | OUTPATIENT
Start: 2025-08-14 | End: 2025-08-14 | Stop reason: HOSPADM

## 2025-08-14 RX ORDER — HALOPERIDOL 5 MG/ML
1 INJECTION INTRAMUSCULAR
Status: DISCONTINUED | OUTPATIENT
Start: 2025-08-14 | End: 2025-08-14 | Stop reason: HOSPADM

## 2025-08-14 RX ORDER — OXYCODONE HYDROCHLORIDE 5 MG/1
5 TABLET ORAL EVERY 4 HOURS PRN
Qty: 12 TABLET | Refills: 0 | Status: SHIPPED | OUTPATIENT
Start: 2025-08-14 | End: 2025-08-17

## 2025-08-14 RX ORDER — CLOPIDOGREL BISULFATE 75 MG/1
75 TABLET ORAL EVERY MORNING
Status: DISCONTINUED | OUTPATIENT
Start: 2025-08-15 | End: 2025-08-15 | Stop reason: HOSPADM

## 2025-08-14 RX ORDER — ONDANSETRON 2 MG/ML
INJECTION INTRAMUSCULAR; INTRAVENOUS PRN
Status: DISCONTINUED | OUTPATIENT
Start: 2025-08-14 | End: 2025-08-14 | Stop reason: SURG

## 2025-08-14 RX ORDER — SODIUM CHLORIDE, SODIUM LACTATE, POTASSIUM CHLORIDE, CALCIUM CHLORIDE 600; 310; 30; 20 MG/100ML; MG/100ML; MG/100ML; MG/100ML
INJECTION, SOLUTION INTRAVENOUS
Status: DISCONTINUED | OUTPATIENT
Start: 2025-08-14 | End: 2025-08-15 | Stop reason: SURG

## 2025-08-14 RX ORDER — SODIUM CHLORIDE, SODIUM LACTATE, POTASSIUM CHLORIDE, CALCIUM CHLORIDE 600; 310; 30; 20 MG/100ML; MG/100ML; MG/100ML; MG/100ML
INJECTION, SOLUTION INTRAVENOUS CONTINUOUS
Status: ACTIVE | OUTPATIENT
Start: 2025-08-14 | End: 2025-08-14

## 2025-08-14 RX ORDER — DEXAMETHASONE SODIUM PHOSPHATE 4 MG/ML
INJECTION, SOLUTION INTRA-ARTICULAR; INTRALESIONAL; INTRAMUSCULAR; INTRAVENOUS; SOFT TISSUE PRN
Status: DISCONTINUED | OUTPATIENT
Start: 2025-08-14 | End: 2025-08-14 | Stop reason: SURG

## 2025-08-14 RX ORDER — MIDAZOLAM HYDROCHLORIDE 1 MG/ML
INJECTION INTRAMUSCULAR; INTRAVENOUS PRN
Status: DISCONTINUED | OUTPATIENT
Start: 2025-08-14 | End: 2025-08-14 | Stop reason: SURG

## 2025-08-14 RX ORDER — IBUPROFEN 600 MG/1
600 TABLET, FILM COATED ORAL EVERY 6 HOURS
Qty: 45 TABLET | Refills: 0 | Status: SHIPPED | OUTPATIENT
Start: 2025-08-14

## 2025-08-14 RX ORDER — HYDROMORPHONE HYDROCHLORIDE 1 MG/ML
0.1 INJECTION, SOLUTION INTRAMUSCULAR; INTRAVENOUS; SUBCUTANEOUS
Status: DISCONTINUED | OUTPATIENT
Start: 2025-08-14 | End: 2025-08-14

## 2025-08-14 RX ORDER — MEPERIDINE HYDROCHLORIDE 50 MG/ML
12.5 INJECTION INTRAMUSCULAR; INTRAVENOUS; SUBCUTANEOUS
Status: DISCONTINUED | OUTPATIENT
Start: 2025-08-14 | End: 2025-08-14 | Stop reason: HOSPADM

## 2025-08-14 RX ORDER — MEPERIDINE HYDROCHLORIDE 50 MG/ML
12.5 INJECTION INTRAMUSCULAR; INTRAVENOUS; SUBCUTANEOUS
Status: DISCONTINUED | OUTPATIENT
Start: 2025-08-14 | End: 2025-08-14

## 2025-08-14 RX ORDER — LIDOCAINE HYDROCHLORIDE 20 MG/ML
INJECTION, SOLUTION EPIDURAL; INFILTRATION; INTRACAUDAL; PERINEURAL PRN
Status: DISCONTINUED | OUTPATIENT
Start: 2025-08-14 | End: 2025-08-14 | Stop reason: SURG

## 2025-08-14 RX ORDER — GLYCOPYRROLATE 0.2 MG/ML
INJECTION INTRAMUSCULAR; INTRAVENOUS
Status: COMPLETED
Start: 2025-08-14 | End: 2025-08-14

## 2025-08-14 RX ORDER — ALBUTEROL SULFATE 5 MG/ML
2.5 SOLUTION RESPIRATORY (INHALATION)
Status: DISCONTINUED | OUTPATIENT
Start: 2025-08-14 | End: 2025-08-14 | Stop reason: HOSPADM

## 2025-08-14 RX ORDER — HYDROMORPHONE HYDROCHLORIDE 1 MG/ML
0.4 INJECTION, SOLUTION INTRAMUSCULAR; INTRAVENOUS; SUBCUTANEOUS
Status: DISCONTINUED | OUTPATIENT
Start: 2025-08-14 | End: 2025-08-14

## 2025-08-14 RX ORDER — HYDROMORPHONE HYDROCHLORIDE 1 MG/ML
0.2 INJECTION, SOLUTION INTRAMUSCULAR; INTRAVENOUS; SUBCUTANEOUS
Status: DISCONTINUED | OUTPATIENT
Start: 2025-08-14 | End: 2025-08-14 | Stop reason: HOSPADM

## 2025-08-14 RX ORDER — BUPIVACAINE HYDROCHLORIDE 2.5 MG/ML
INJECTION, SOLUTION EPIDURAL; INFILTRATION; INTRACAUDAL; PERINEURAL
Status: COMPLETED | OUTPATIENT
Start: 2025-08-14 | End: 2025-08-14

## 2025-08-14 RX ORDER — DIPHENHYDRAMINE HYDROCHLORIDE 50 MG/ML
12.5 INJECTION, SOLUTION INTRAMUSCULAR; INTRAVENOUS
Status: DISCONTINUED | OUTPATIENT
Start: 2025-08-14 | End: 2025-08-14

## 2025-08-14 RX ORDER — GLYCOPYRROLATE 0.2 MG/ML
0.2 INJECTION INTRAMUSCULAR; INTRAVENOUS PRN
Status: DISCONTINUED | OUTPATIENT
Start: 2025-08-14 | End: 2025-08-14 | Stop reason: HOSPADM

## 2025-08-14 RX ORDER — LABETALOL HYDROCHLORIDE 5 MG/ML
5 INJECTION, SOLUTION INTRAVENOUS
Status: DISCONTINUED | OUTPATIENT
Start: 2025-08-14 | End: 2025-08-14 | Stop reason: HOSPADM

## 2025-08-14 RX ORDER — EPHEDRINE SULFATE 50 MG/ML
10 INJECTION, SOLUTION INTRAVENOUS ONCE
Status: COMPLETED | OUTPATIENT
Start: 2025-08-14 | End: 2025-08-14

## 2025-08-14 RX ORDER — ATORVASTATIN CALCIUM 80 MG/1
80 TABLET, FILM COATED ORAL EVERY EVENING
Status: DISCONTINUED | OUTPATIENT
Start: 2025-08-15 | End: 2025-08-15 | Stop reason: HOSPADM

## 2025-08-14 RX ORDER — BUPIVACAINE HYDROCHLORIDE AND EPINEPHRINE 5; 5 MG/ML; UG/ML
INJECTION, SOLUTION EPIDURAL; INTRACAUDAL; PERINEURAL
Status: DISCONTINUED | OUTPATIENT
Start: 2025-08-14 | End: 2025-08-14 | Stop reason: HOSPADM

## 2025-08-14 RX ORDER — OXYCODONE HCL 5 MG/5 ML
5 SOLUTION, ORAL ORAL
Refills: 0 | Status: DISCONTINUED | OUTPATIENT
Start: 2025-08-14 | End: 2025-08-14

## 2025-08-14 RX ORDER — ALBUTEROL SULFATE 5 MG/ML
2.5 SOLUTION RESPIRATORY (INHALATION)
Status: DISCONTINUED | OUTPATIENT
Start: 2025-08-14 | End: 2025-08-14

## 2025-08-14 RX ORDER — POLYETHYLENE GLYCOL 3350 17 G/17G
17 POWDER, FOR SOLUTION ORAL DAILY
Qty: 20 EACH | Refills: 0 | Status: SHIPPED | OUTPATIENT
Start: 2025-08-14

## 2025-08-14 RX ORDER — DIPHENHYDRAMINE HYDROCHLORIDE 50 MG/ML
12.5 INJECTION, SOLUTION INTRAMUSCULAR; INTRAVENOUS
Status: DISCONTINUED | OUTPATIENT
Start: 2025-08-14 | End: 2025-08-14 | Stop reason: HOSPADM

## 2025-08-14 RX ORDER — ONDANSETRON 2 MG/ML
4 INJECTION INTRAMUSCULAR; INTRAVENOUS
Status: COMPLETED | OUTPATIENT
Start: 2025-08-14 | End: 2025-08-14

## 2025-08-14 RX ORDER — ONDANSETRON 2 MG/ML
4 INJECTION INTRAMUSCULAR; INTRAVENOUS
Status: DISCONTINUED | OUTPATIENT
Start: 2025-08-14 | End: 2025-08-14

## 2025-08-14 RX ORDER — HALOPERIDOL 5 MG/ML
1 INJECTION INTRAMUSCULAR
Status: DISCONTINUED | OUTPATIENT
Start: 2025-08-14 | End: 2025-08-14

## 2025-08-14 RX ORDER — HYDROMORPHONE HYDROCHLORIDE 1 MG/ML
0.1 INJECTION, SOLUTION INTRAMUSCULAR; INTRAVENOUS; SUBCUTANEOUS
Status: DISCONTINUED | OUTPATIENT
Start: 2025-08-14 | End: 2025-08-14 | Stop reason: HOSPADM

## 2025-08-14 RX ORDER — ACETAMINOPHEN 325 MG/1
650 TABLET ORAL EVERY 6 HOURS
Qty: 60 TABLET | Refills: 0 | Status: SHIPPED | OUTPATIENT
Start: 2025-08-14

## 2025-08-14 RX ORDER — OXYCODONE HCL 5 MG/5 ML
10 SOLUTION, ORAL ORAL
Refills: 0 | Status: DISCONTINUED | OUTPATIENT
Start: 2025-08-14 | End: 2025-08-14

## 2025-08-14 RX ORDER — HYDROMORPHONE HYDROCHLORIDE 1 MG/ML
0.2 INJECTION, SOLUTION INTRAMUSCULAR; INTRAVENOUS; SUBCUTANEOUS
Status: DISCONTINUED | OUTPATIENT
Start: 2025-08-14 | End: 2025-08-14

## 2025-08-14 RX ORDER — OXYCODONE HCL 5 MG/5 ML
5 SOLUTION, ORAL ORAL
Refills: 0 | Status: COMPLETED | OUTPATIENT
Start: 2025-08-14 | End: 2025-08-14

## 2025-08-14 RX ORDER — EPHEDRINE SULFATE 50 MG/ML
INJECTION, SOLUTION INTRAVENOUS
Status: DISPENSED
Start: 2025-08-14 | End: 2025-08-15

## 2025-08-14 RX ORDER — EPHEDRINE SULFATE 50 MG/ML
INJECTION, SOLUTION INTRAVENOUS
Status: COMPLETED
Start: 2025-08-14 | End: 2025-08-14

## 2025-08-14 RX ORDER — DEXTROSE MONOHYDRATE, SODIUM CHLORIDE, AND POTASSIUM CHLORIDE 50; 1.49; 4.5 G/1000ML; G/1000ML; G/1000ML
INJECTION, SOLUTION INTRAVENOUS CONTINUOUS
Status: DISPENSED | OUTPATIENT
Start: 2025-08-14 | End: 2025-08-15

## 2025-08-14 RX ORDER — CEFAZOLIN SODIUM 1 G/3ML
INJECTION, POWDER, FOR SOLUTION INTRAMUSCULAR; INTRAVENOUS PRN
Status: DISCONTINUED | OUTPATIENT
Start: 2025-08-14 | End: 2025-08-14 | Stop reason: SURG

## 2025-08-14 RX ORDER — OXYCODONE HCL 5 MG/5 ML
10 SOLUTION, ORAL ORAL
Refills: 0 | Status: COMPLETED | OUTPATIENT
Start: 2025-08-14 | End: 2025-08-14

## 2025-08-14 RX ORDER — HYDROMORPHONE HYDROCHLORIDE 1 MG/ML
0.4 INJECTION, SOLUTION INTRAMUSCULAR; INTRAVENOUS; SUBCUTANEOUS
Status: DISCONTINUED | OUTPATIENT
Start: 2025-08-14 | End: 2025-08-14 | Stop reason: HOSPADM

## 2025-08-14 RX ADMIN — SUGAMMADEX 200 MG: 100 INJECTION, SOLUTION INTRAVENOUS at 17:18

## 2025-08-14 RX ADMIN — FENTANYL CITRATE 100 MCG: 50 INJECTION, SOLUTION INTRAMUSCULAR; INTRAVENOUS at 16:20

## 2025-08-14 RX ADMIN — ONDANSETRON 4 MG: 2 INJECTION INTRAMUSCULAR; INTRAVENOUS at 16:18

## 2025-08-14 RX ADMIN — DEXAMETHASONE SODIUM PHOSPHATE 4 MG: 4 INJECTION INTRA-ARTICULAR; INTRALESIONAL; INTRAMUSCULAR; INTRAVENOUS; SOFT TISSUE at 23:08

## 2025-08-14 RX ADMIN — FENTANYL CITRATE 25 MCG: 50 INJECTION, SOLUTION INTRAMUSCULAR; INTRAVENOUS at 17:53

## 2025-08-14 RX ADMIN — PROPOFOL 130 MG: 10 INJECTION, EMULSION INTRAVENOUS at 23:07

## 2025-08-14 RX ADMIN — SODIUM CHLORIDE, POTASSIUM CHLORIDE, SODIUM LACTATE AND CALCIUM CHLORIDE: 600; 310; 30; 20 INJECTION, SOLUTION INTRAVENOUS at 23:01

## 2025-08-14 RX ADMIN — ROCURONIUM BROMIDE 50 MG: 10 INJECTION INTRAVENOUS at 23:07

## 2025-08-14 RX ADMIN — MIDAZOLAM HYDROCHLORIDE 2 MG: 1 INJECTION, SOLUTION INTRAMUSCULAR; INTRAVENOUS at 16:16

## 2025-08-14 RX ADMIN — OXYCODONE HYDROCHLORIDE 5 MG: 5 SOLUTION ORAL at 17:52

## 2025-08-14 RX ADMIN — EPHEDRINE SULFATE 10 MG: 50 INJECTION, SOLUTION INTRAVENOUS at 18:35

## 2025-08-14 RX ADMIN — PROPOFOL 130 MG: 10 INJECTION, EMULSION INTRAVENOUS at 16:18

## 2025-08-14 RX ADMIN — DEXAMETHASONE SODIUM PHOSPHATE 8 MG: 4 INJECTION INTRA-ARTICULAR; INTRALESIONAL; INTRAMUSCULAR; INTRAVENOUS; SOFT TISSUE at 16:18

## 2025-08-14 RX ADMIN — CEFAZOLIN 2 G: 1 INJECTION, POWDER, FOR SOLUTION INTRAMUSCULAR; INTRAVENOUS at 16:14

## 2025-08-14 RX ADMIN — CEFAZOLIN 2 G: 1 INJECTION, POWDER, FOR SOLUTION INTRAMUSCULAR; INTRAVENOUS at 23:03

## 2025-08-14 RX ADMIN — SODIUM CHLORIDE, POTASSIUM CHLORIDE, SODIUM LACTATE AND CALCIUM CHLORIDE: 600; 310; 30; 20 INJECTION, SOLUTION INTRAVENOUS at 12:32

## 2025-08-14 RX ADMIN — BUPIVACAINE HYDROCHLORIDE 20 ML: 2.5 INJECTION, SOLUTION EPIDURAL; INFILTRATION; INTRACAUDAL at 17:12

## 2025-08-14 RX ADMIN — ROCURONIUM BROMIDE 60 MG: 10 INJECTION, SOLUTION INTRAVENOUS at 16:18

## 2025-08-14 RX ADMIN — LIDOCAINE HYDROCHLORIDE 100 MG: 20 INJECTION, SOLUTION EPIDURAL; INFILTRATION; INTRACAUDAL; PERINEURAL at 16:16

## 2025-08-14 ASSESSMENT — PAIN DESCRIPTION - PAIN TYPE
TYPE: ACUTE PAIN;SURGICAL PAIN
TYPE: SURGICAL PAIN
TYPE: SURGICAL PAIN
TYPE: ACUTE PAIN;SURGICAL PAIN
TYPE: SURGICAL PAIN
TYPE: ACUTE PAIN;SURGICAL PAIN
TYPE: SURGICAL PAIN
TYPE: ACUTE PAIN;SURGICAL PAIN
TYPE: SURGICAL PAIN
TYPE: ACUTE PAIN;SURGICAL PAIN
TYPE: SURGICAL PAIN

## 2025-08-14 ASSESSMENT — PAIN SCALES - GENERAL: PAIN_LEVEL: 2

## 2025-08-14 ASSESSMENT — FIBROSIS 4 INDEX
FIB4 SCORE: 2.09
FIB4 SCORE: 2.09

## 2025-08-15 VITALS
OXYGEN SATURATION: 96 % | SYSTOLIC BLOOD PRESSURE: 91 MMHG | DIASTOLIC BLOOD PRESSURE: 47 MMHG | TEMPERATURE: 97.3 F | BODY MASS INDEX: 17.52 KG/M2 | WEIGHT: 105.16 LBS | RESPIRATION RATE: 16 BRPM | HEIGHT: 65 IN | HEART RATE: 61 BPM

## 2025-08-15 LAB
ACANTHOCYTES BLD QL SMEAR: NORMAL
ANION GAP SERPL CALC-SCNC: 10 MMOL/L (ref 7–16)
ANISOCYTOSIS BLD QL SMEAR: ABNORMAL
BASOPHILS # BLD AUTO: 0 % (ref 0–1.8)
BASOPHILS # BLD: 0 K/UL (ref 0–0.12)
BUN SERPL-MCNC: 13 MG/DL (ref 8–22)
BURR CELLS BLD QL SMEAR: NORMAL
CALCIUM SERPL-MCNC: 7.9 MG/DL (ref 8.5–10.5)
CHLORIDE SERPL-SCNC: 100 MMOL/L (ref 96–112)
CO2 SERPL-SCNC: 22 MMOL/L (ref 20–33)
CREAT SERPL-MCNC: 0.79 MG/DL (ref 0.5–1.4)
EKG IMPRESSION: NORMAL
EOSINOPHIL # BLD AUTO: 0 K/UL (ref 0–0.51)
EOSINOPHIL NFR BLD: 0 % (ref 0–6.9)
ERYTHROCYTE [DISTWIDTH] IN BLOOD BY AUTOMATED COUNT: 63.5 FL (ref 35.9–50)
GFR SERPLBLD CREATININE-BSD FMLA CKD-EPI: 75 ML/MIN/1.73 M 2
GLUCOSE SERPL-MCNC: 143 MG/DL (ref 65–99)
HCT VFR BLD AUTO: 24.7 % (ref 37–47)
HGB BLD-MCNC: 8.3 G/DL (ref 12–16)
LYMPHOCYTES # BLD AUTO: 0.43 K/UL (ref 1–4.8)
LYMPHOCYTES NFR BLD: 2.6 % (ref 22–41)
MACROCYTES BLD QL SMEAR: ABNORMAL
MANUAL DIFF BLD: NORMAL
MCH RBC QN AUTO: 31.9 PG (ref 27–33)
MCHC RBC AUTO-ENTMCNC: 33.6 G/DL (ref 32.2–35.5)
MCV RBC AUTO: 95 FL (ref 81.4–97.8)
MICROCYTES BLD QL SMEAR: ABNORMAL
MONOCYTES # BLD AUTO: 0.4 K/UL (ref 0–0.85)
MONOCYTES NFR BLD AUTO: 2.6 % (ref 0–13.4)
MORPHOLOGY BLD-IMP: NORMAL
NEUTROPHILS # BLD AUTO: 15.55 K/UL (ref 1.82–7.42)
NEUTROPHILS NFR BLD: 94 % (ref 44–72)
NEUTS BAND NFR BLD MANUAL: 0.8 % (ref 0–10)
NRBC # BLD AUTO: 0 K/UL
NRBC BLD-RTO: 0 /100 WBC (ref 0–0.2)
OVALOCYTES BLD QL SMEAR: NORMAL
PLATELET # BLD AUTO: 324 K/UL (ref 164–446)
PLATELET BLD QL SMEAR: NORMAL
PMV BLD AUTO: 12.4 FL (ref 9–12.9)
POIKILOCYTOSIS BLD QL SMEAR: NORMAL
POTASSIUM SERPL-SCNC: 5.1 MMOL/L (ref 3.6–5.5)
RBC # BLD AUTO: 2.6 M/UL (ref 4.2–5.4)
RBC BLD AUTO: PRESENT
SCHISTOCYTES BLD QL SMEAR: NORMAL
SODIUM SERPL-SCNC: 132 MMOL/L (ref 135–145)
WBC # BLD AUTO: 16.4 K/UL (ref 4.8–10.8)

## 2025-08-15 PROCEDURE — 85007 BL SMEAR W/DIFF WBC COUNT: CPT

## 2025-08-15 PROCEDURE — 80048 BASIC METABOLIC PNL TOTAL CA: CPT

## 2025-08-15 PROCEDURE — 700111 HCHG RX REV CODE 636 W/ 250 OVERRIDE (IP): Performed by: ANESTHESIOLOGY

## 2025-08-15 PROCEDURE — 700101 HCHG RX REV CODE 250: Performed by: ANESTHESIOLOGY

## 2025-08-15 PROCEDURE — 700102 HCHG RX REV CODE 250 W/ 637 OVERRIDE(OP): Performed by: ANESTHESIOLOGY

## 2025-08-15 PROCEDURE — A9270 NON-COVERED ITEM OR SERVICE: HCPCS | Performed by: SURGERY

## 2025-08-15 PROCEDURE — 770030 HCHG ROOM/CARE - EXTENDED RECOVERY EACH 15 MIN

## 2025-08-15 PROCEDURE — 85027 COMPLETE CBC AUTOMATED: CPT

## 2025-08-15 PROCEDURE — A9270 NON-COVERED ITEM OR SERVICE: HCPCS | Performed by: ANESTHESIOLOGY

## 2025-08-15 PROCEDURE — 700111 HCHG RX REV CODE 636 W/ 250 OVERRIDE (IP): Mod: JZ | Performed by: SURGERY

## 2025-08-15 PROCEDURE — 700102 HCHG RX REV CODE 250 W/ 637 OVERRIDE(OP): Performed by: SURGERY

## 2025-08-15 RX ORDER — ACETAMINOPHEN 500 MG
1000 TABLET ORAL EVERY 6 HOURS
Status: DISCONTINUED | OUTPATIENT
Start: 2025-08-15 | End: 2025-08-15 | Stop reason: HOSPADM

## 2025-08-15 RX ORDER — KETOROLAC TROMETHAMINE 15 MG/ML
15 INJECTION, SOLUTION INTRAMUSCULAR; INTRAVENOUS EVERY 6 HOURS
Status: DISCONTINUED | OUTPATIENT
Start: 2025-08-15 | End: 2025-08-15 | Stop reason: HOSPADM

## 2025-08-15 RX ORDER — DIPHENHYDRAMINE HYDROCHLORIDE 50 MG/ML
25 INJECTION, SOLUTION INTRAMUSCULAR; INTRAVENOUS EVERY 6 HOURS PRN
Status: DISCONTINUED | OUTPATIENT
Start: 2025-08-15 | End: 2025-08-15 | Stop reason: HOSPADM

## 2025-08-15 RX ORDER — DEXAMETHASONE SODIUM PHOSPHATE 4 MG/ML
INJECTION, SOLUTION INTRA-ARTICULAR; INTRALESIONAL; INTRAMUSCULAR; INTRAVENOUS; SOFT TISSUE PRN
Status: DISCONTINUED | OUTPATIENT
Start: 2025-08-14 | End: 2025-08-15 | Stop reason: SURG

## 2025-08-15 RX ORDER — OXYCODONE HYDROCHLORIDE 5 MG/1
5 TABLET ORAL
Status: DISCONTINUED | OUTPATIENT
Start: 2025-08-15 | End: 2025-08-15 | Stop reason: HOSPADM

## 2025-08-15 RX ORDER — ACETAMINOPHEN 500 MG
1000 TABLET ORAL EVERY 6 HOURS PRN
Status: DISCONTINUED | OUTPATIENT
Start: 2025-08-20 | End: 2025-08-15 | Stop reason: HOSPADM

## 2025-08-15 RX ORDER — ONDANSETRON 2 MG/ML
4 INJECTION INTRAMUSCULAR; INTRAVENOUS EVERY 4 HOURS PRN
Status: DISCONTINUED | OUTPATIENT
Start: 2025-08-15 | End: 2025-08-15 | Stop reason: HOSPADM

## 2025-08-15 RX ORDER — CEFAZOLIN SODIUM 1 G/3ML
INJECTION, POWDER, FOR SOLUTION INTRAMUSCULAR; INTRAVENOUS PRN
Status: DISCONTINUED | OUTPATIENT
Start: 2025-08-14 | End: 2025-08-15 | Stop reason: SURG

## 2025-08-15 RX ORDER — DEXAMETHASONE SODIUM PHOSPHATE 4 MG/ML
4 INJECTION, SOLUTION INTRA-ARTICULAR; INTRALESIONAL; INTRAMUSCULAR; INTRAVENOUS; SOFT TISSUE
Status: DISCONTINUED | OUTPATIENT
Start: 2025-08-15 | End: 2025-08-15 | Stop reason: HOSPADM

## 2025-08-15 RX ORDER — DIPHENHYDRAMINE HCL 25 MG
25 TABLET ORAL EVERY 6 HOURS PRN
Status: DISCONTINUED | OUTPATIENT
Start: 2025-08-15 | End: 2025-08-15 | Stop reason: HOSPADM

## 2025-08-15 RX ORDER — DIPHENHYDRAMINE HYDROCHLORIDE 50 MG/ML
12.5 INJECTION, SOLUTION INTRAMUSCULAR; INTRAVENOUS
Status: DISCONTINUED | OUTPATIENT
Start: 2025-08-15 | End: 2025-08-15 | Stop reason: HOSPADM

## 2025-08-15 RX ORDER — CALCIUM CARBONATE 500 MG/1
500 TABLET, CHEWABLE ORAL
Status: DISCONTINUED | OUTPATIENT
Start: 2025-08-15 | End: 2025-08-15 | Stop reason: HOSPADM

## 2025-08-15 RX ORDER — HYDROMORPHONE HYDROCHLORIDE 1 MG/ML
0.1 INJECTION, SOLUTION INTRAMUSCULAR; INTRAVENOUS; SUBCUTANEOUS
Status: DISCONTINUED | OUTPATIENT
Start: 2025-08-15 | End: 2025-08-15 | Stop reason: HOSPADM

## 2025-08-15 RX ORDER — MEPERIDINE HYDROCHLORIDE 50 MG/ML
12.5 INJECTION INTRAMUSCULAR; INTRAVENOUS; SUBCUTANEOUS
Status: DISCONTINUED | OUTPATIENT
Start: 2025-08-15 | End: 2025-08-15 | Stop reason: HOSPADM

## 2025-08-15 RX ORDER — HALOPERIDOL 5 MG/ML
1 INJECTION INTRAMUSCULAR
Status: DISCONTINUED | OUTPATIENT
Start: 2025-08-15 | End: 2025-08-15 | Stop reason: HOSPADM

## 2025-08-15 RX ORDER — HYDROMORPHONE HYDROCHLORIDE 1 MG/ML
0.5 INJECTION, SOLUTION INTRAMUSCULAR; INTRAVENOUS; SUBCUTANEOUS
Status: DISCONTINUED | OUTPATIENT
Start: 2025-08-15 | End: 2025-08-15 | Stop reason: HOSPADM

## 2025-08-15 RX ORDER — SCOPOLAMINE 1 MG/3D
1 PATCH, EXTENDED RELEASE TRANSDERMAL
Status: DISCONTINUED | OUTPATIENT
Start: 2025-08-15 | End: 2025-08-15 | Stop reason: HOSPADM

## 2025-08-15 RX ORDER — HYDROMORPHONE HYDROCHLORIDE 1 MG/ML
0.2 INJECTION, SOLUTION INTRAMUSCULAR; INTRAVENOUS; SUBCUTANEOUS
Status: DISCONTINUED | OUTPATIENT
Start: 2025-08-15 | End: 2025-08-15 | Stop reason: HOSPADM

## 2025-08-15 RX ORDER — IBUPROFEN 800 MG/1
800 TABLET, FILM COATED ORAL 3 TIMES DAILY PRN
Status: DISCONTINUED | OUTPATIENT
Start: 2025-08-18 | End: 2025-08-15 | Stop reason: HOSPADM

## 2025-08-15 RX ORDER — SODIUM CHLORIDE, SODIUM LACTATE, POTASSIUM CHLORIDE, AND CALCIUM CHLORIDE .6; .31; .03; .02 G/100ML; G/100ML; G/100ML; G/100ML
500 INJECTION, SOLUTION INTRAVENOUS
Status: DISCONTINUED | OUTPATIENT
Start: 2025-08-15 | End: 2025-08-15 | Stop reason: HOSPADM

## 2025-08-15 RX ORDER — HALOPERIDOL 5 MG/ML
1 INJECTION INTRAMUSCULAR EVERY 6 HOURS PRN
Status: DISCONTINUED | OUTPATIENT
Start: 2025-08-15 | End: 2025-08-15 | Stop reason: HOSPADM

## 2025-08-15 RX ORDER — ENOXAPARIN SODIUM 100 MG/ML
40 INJECTION SUBCUTANEOUS DAILY
Status: DISCONTINUED | OUTPATIENT
Start: 2025-08-15 | End: 2025-08-15 | Stop reason: HOSPADM

## 2025-08-15 RX ORDER — OXYCODONE HCL 5 MG/5 ML
5 SOLUTION, ORAL ORAL
Status: COMPLETED | OUTPATIENT
Start: 2025-08-15 | End: 2025-08-15

## 2025-08-15 RX ORDER — OXYCODONE HYDROCHLORIDE 10 MG/1
10 TABLET ORAL
Status: DISCONTINUED | OUTPATIENT
Start: 2025-08-15 | End: 2025-08-15 | Stop reason: HOSPADM

## 2025-08-15 RX ORDER — ALBUTEROL SULFATE 5 MG/ML
2.5 SOLUTION RESPIRATORY (INHALATION)
Status: DISCONTINUED | OUTPATIENT
Start: 2025-08-15 | End: 2025-08-15 | Stop reason: HOSPADM

## 2025-08-15 RX ORDER — OXYCODONE HCL 5 MG/5 ML
10 SOLUTION, ORAL ORAL
Status: COMPLETED | OUTPATIENT
Start: 2025-08-15 | End: 2025-08-15

## 2025-08-15 RX ORDER — ONDANSETRON 2 MG/ML
4 INJECTION INTRAMUSCULAR; INTRAVENOUS
Status: COMPLETED | OUTPATIENT
Start: 2025-08-15 | End: 2025-08-15

## 2025-08-15 RX ORDER — HYDROMORPHONE HYDROCHLORIDE 1 MG/ML
0.4 INJECTION, SOLUTION INTRAMUSCULAR; INTRAVENOUS; SUBCUTANEOUS
Status: DISCONTINUED | OUTPATIENT
Start: 2025-08-15 | End: 2025-08-15 | Stop reason: HOSPADM

## 2025-08-15 RX ORDER — ONDANSETRON 2 MG/ML
INJECTION INTRAMUSCULAR; INTRAVENOUS PRN
Status: DISCONTINUED | OUTPATIENT
Start: 2025-08-15 | End: 2025-08-15 | Stop reason: SURG

## 2025-08-15 RX ADMIN — SUGAMMADEX 200 MG: 100 INJECTION, SOLUTION INTRAVENOUS at 00:01

## 2025-08-15 RX ADMIN — ONDANSETRON 4 MG: 2 INJECTION INTRAMUSCULAR; INTRAVENOUS at 00:00

## 2025-08-15 RX ADMIN — KETOROLAC TROMETHAMINE 15 MG: 15 INJECTION, SOLUTION INTRAMUSCULAR; INTRAVENOUS at 08:09

## 2025-08-15 RX ADMIN — ENOXAPARIN SODIUM 40 MG: 100 INJECTION SUBCUTANEOUS at 05:07

## 2025-08-15 RX ADMIN — ACETAMINOPHEN 1000 MG: 500 TABLET ORAL at 05:07

## 2025-08-15 RX ADMIN — KETOROLAC TROMETHAMINE 15 MG: 15 INJECTION, SOLUTION INTRAMUSCULAR; INTRAVENOUS at 01:28

## 2025-08-15 RX ADMIN — OXYCODONE HYDROCHLORIDE 10 MG: 5 SOLUTION ORAL at 00:17

## 2025-08-15 RX ADMIN — ONDANSETRON 4 MG: 2 INJECTION INTRAMUSCULAR; INTRAVENOUS at 00:19

## 2025-08-15 RX ADMIN — PANCRELIPASE 6000 UNITS: 30000; 6000; 19000 CAPSULE, DELAYED RELEASE PELLETS ORAL at 08:09

## 2025-08-15 RX ADMIN — CLOPIDOGREL BISULFATE 75 MG: 75 TABLET, FILM COATED ORAL at 05:07

## 2025-08-15 RX ADMIN — MEPERIDINE HYDROCHLORIDE 12.5 MG: 50 INJECTION INTRAMUSCULAR; INTRAVENOUS; SUBCUTANEOUS at 00:18

## 2025-08-15 ASSESSMENT — COGNITIVE AND FUNCTIONAL STATUS - GENERAL
SUGGESTED CMS G CODE MODIFIER DAILY ACTIVITY: CJ
WALKING IN HOSPITAL ROOM: A LITTLE
TOILETING: A LITTLE
CLIMB 3 TO 5 STEPS WITH RAILING: A LITTLE
DAILY ACTIVITIY SCORE: 21
DRESSING REGULAR LOWER BODY CLOTHING: A LITTLE
SUGGESTED CMS G CODE MODIFIER MOBILITY: CJ
MOBILITY SCORE: 21
HELP NEEDED FOR BATHING: A LITTLE
STANDING UP FROM CHAIR USING ARMS: A LITTLE

## 2025-08-15 ASSESSMENT — PATIENT HEALTH QUESTIONNAIRE - PHQ9
1. LITTLE INTEREST OR PLEASURE IN DOING THINGS: NOT AT ALL
2. FEELING DOWN, DEPRESSED, IRRITABLE, OR HOPELESS: NOT AT ALL
SUM OF ALL RESPONSES TO PHQ9 QUESTIONS 1 AND 2: 0

## 2025-08-15 ASSESSMENT — PAIN DESCRIPTION - PAIN TYPE
TYPE: ACUTE PAIN
TYPE: SURGICAL PAIN

## 2025-08-15 ASSESSMENT — LIFESTYLE VARIABLES
AVERAGE NUMBER OF DAYS PER WEEK YOU HAVE A DRINK CONTAINING ALCOHOL: 0
CONSUMPTION TOTAL: NEGATIVE
HAVE PEOPLE ANNOYED YOU BY CRITICIZING YOUR DRINKING: NO
HOW MANY TIMES IN THE PAST YEAR HAVE YOU HAD 5 OR MORE DRINKS IN A DAY: 0
EVER HAD A DRINK FIRST THING IN THE MORNING TO STEADY YOUR NERVES TO GET RID OF A HANGOVER: NO
EVER FELT BAD OR GUILTY ABOUT YOUR DRINKING: NO
HAVE YOU EVER FELT YOU SHOULD CUT DOWN ON YOUR DRINKING: NO
TOTAL SCORE: 0
TOTAL SCORE: 0
ON A TYPICAL DAY WHEN YOU DRINK ALCOHOL HOW MANY DRINKS DO YOU HAVE: 0
ALCOHOL_USE: NO
TOTAL SCORE: 0

## 2025-08-15 ASSESSMENT — PAIN SCALES - GENERAL: PAIN_LEVEL: 3

## (undated) DEVICE — SEAL UNIVERSAL 5MM-12MM (10EA/BX)

## (undated) DEVICE — DRAPE COLUMN BOX OF 20

## (undated) DEVICE — COVER TIP ENDOWRIST HOT SHEAR - (10EA/BX) DA VINCI

## (undated) DEVICE — ELECTRODE DUAL RETURN W/ CORD - (50/PK)

## (undated) DEVICE — GLOVESZ 8.5 BIOGEL PI MICRO - PF LF (50PR/BX)

## (undated) DEVICE — PACK DAVINCI GENERAL (3EA/CA)

## (undated) DEVICE — NEEDLE DRIVER MEGA SUTURECUT DA VINCI 15X'S REUSABLE

## (undated) DEVICE — SHEARS MONOPOLAR CURVED DA VINCI 10X'S REUSABLE

## (undated) DEVICE — CHLORAPREP 26 ML APPLICATOR - ORANGE TINT(25/CA)

## (undated) DEVICE — TROCAR 5X100 NON BLADED Z-TH - READ KII (6/BX)

## (undated) DEVICE — BIPOLAR FORCE DA VINCI 12X'S REISABLE

## (undated) DEVICE — BLADE SURGICAL #15 - (50/BX 3BX/CA)

## (undated) DEVICE — OBTURATOR BLADELESS STANDARD 8MM (6EA/BX)

## (undated) DEVICE — GOWN WARMING STANDARD FLEX - (30/CA)

## (undated) DEVICE — SET TUBING PNEUMOCLEAR HIGH FLOW SMOKE EVACUATION (10EA/BX)

## (undated) DEVICE — SYSTEM CLEARIFY VISUALIZATION (10EA/PK)

## (undated) DEVICE — SUTURE

## (undated) DEVICE — DRAPE ARM BOX OF 20

## (undated) DEVICE — COVER LIGHT HANDLE ALC PLUS DISP (18EA/BX)

## (undated) DEVICE — SUTURE 2-0 20CM STRATAFIX SPIRAL SH NEEDLE (12/BX)

## (undated) DEVICE — SENSOR OXIMETER ADULT SPO2 RD SET (20EA/BX)

## (undated) DEVICE — SUTURE 4-0 MONOCRYL PLUS PS-2 - 27 INCH (36/BX)

## (undated) DEVICE — SET EXTENSION WITH 2 PORTS (48EA/CA) ***PART #2C8610 IS A SUBSTITUTE*****

## (undated) DEVICE — SET LEADWIRE 5 LEAD BEDSIDE DISPOSABLE ECG (1SET OF 5/EA)

## (undated) DEVICE — SUCTION INSTRUMENT YANKAUER BULBOUS TIP W/O VENT (50EA/CA)

## (undated) DEVICE — SUTURE GENERAL

## (undated) DEVICE — SUTURE 3-0 MONOCRYL PLUS PS-1 - 27 INCH (36/BX)

## (undated) DEVICE — CANISTER SUCTION 3000ML MECHANICAL FILTER AUTO SHUTOFF MEDI-VAC NONSTERILE LF DISP (40EA/CA)

## (undated) DEVICE — GLOVE BIOGEL INDICATOR SZ 8 SURGICAL PF LTX - (50/BX 4BX/CA)

## (undated) DEVICE — DERMABOND ADVANCED - (12EA/BX)

## (undated) DEVICE — LACTATED RINGERS INJ 1000 ML - (14EA/CA 60CA/PF)

## (undated) DEVICE — GLOVE BIOGEL PI INDICATOR SZ 8.0 SURGICAL PF LF -(50/BX 4BX/CA)

## (undated) DEVICE — SYRINGE ASEPTO - (50EA/CA

## (undated) DEVICE — SLEEVE VASO DVT COMPRESSION CALF MED - (10PR/CA)

## (undated) DEVICE — TUBING CLEARLINK DUO-VENT - C-FLO (48EA/CA)

## (undated) DEVICE — GLOVE SZ 7.5 BIOGEL PI MICRO - PF LF (50PR/BX)

## (undated) DEVICE — NEEDLE INSUFFLATION FOR STEP - (12/BX)

## (undated) DEVICE — ARMREST CRADLE FOAM - (2PR/PK 12PR/CA)